# Patient Record
Sex: MALE | Race: BLACK OR AFRICAN AMERICAN | Employment: FULL TIME | URBAN - METROPOLITAN AREA
[De-identification: names, ages, dates, MRNs, and addresses within clinical notes are randomized per-mention and may not be internally consistent; named-entity substitution may affect disease eponyms.]

---

## 2019-10-18 ENCOUNTER — APPOINTMENT (OUTPATIENT)
Dept: NON INVASIVE DIAGNOSTICS | Age: 49
End: 2019-10-18
Attending: INTERNAL MEDICINE
Payer: COMMERCIAL

## 2019-10-18 ENCOUNTER — HOSPITAL ENCOUNTER (OUTPATIENT)
Age: 49
Setting detail: OBSERVATION
LOS: 2 days | Discharge: HOME | End: 2019-10-21
Attending: STUDENT IN AN ORGANIZED HEALTH CARE EDUCATION/TRAINING PROGRAM | Admitting: INTERNAL MEDICINE
Payer: COMMERCIAL

## 2019-10-18 ENCOUNTER — APPOINTMENT (OUTPATIENT)
Dept: CT IMAGING | Age: 49
End: 2019-10-18
Attending: STUDENT IN AN ORGANIZED HEALTH CARE EDUCATION/TRAINING PROGRAM
Payer: COMMERCIAL

## 2019-10-18 ENCOUNTER — APPOINTMENT (OUTPATIENT)
Dept: GENERAL RADIOLOGY | Age: 49
End: 2019-10-18
Attending: STUDENT IN AN ORGANIZED HEALTH CARE EDUCATION/TRAINING PROGRAM
Payer: COMMERCIAL

## 2019-10-18 DIAGNOSIS — R42 DIZZINESS: ICD-10-CM

## 2019-10-18 DIAGNOSIS — I63.9 CEREBROVASCULAR ACCIDENT (CVA), UNSPECIFIED MECHANISM (HCC): ICD-10-CM

## 2019-10-18 DIAGNOSIS — R42 VERTIGO: Primary | ICD-10-CM

## 2019-10-18 DIAGNOSIS — Z92.82 RECEIVED INTRAVENOUS TISSUE PLASMINOGEN ACTIVATOR (TPA) IN EMERGENCY DEPARTMENT: ICD-10-CM

## 2019-10-18 PROBLEM — E78.49 OTHER HYPERLIPIDEMIA: Status: ACTIVE | Noted: 2019-10-18

## 2019-10-18 PROBLEM — I10 ESSENTIAL HYPERTENSION: Status: ACTIVE | Noted: 2019-10-18

## 2019-10-18 LAB
ALBUMIN SERPL-MCNC: 4 G/DL (ref 3.5–5)
ALBUMIN/GLOB SERPL: 1 {RATIO} (ref 1.1–2.2)
ALP SERPL-CCNC: 63 U/L (ref 45–117)
ALT SERPL-CCNC: 31 U/L (ref 12–78)
ANION GAP SERPL CALC-SCNC: 7 MMOL/L (ref 5–15)
AST SERPL-CCNC: 24 U/L (ref 15–37)
ATRIAL RATE: 79 BPM
BASOPHILS # BLD: 0 K/UL (ref 0–0.1)
BASOPHILS NFR BLD: 0 % (ref 0–1)
BILIRUB SERPL-MCNC: 0.6 MG/DL (ref 0.2–1)
BUN SERPL-MCNC: 22 MG/DL (ref 6–20)
BUN/CREAT SERPL: 25 (ref 12–20)
CALCIUM SERPL-MCNC: 9.2 MG/DL (ref 8.5–10.1)
CALCULATED P AXIS, ECG09: 68 DEGREES
CALCULATED R AXIS, ECG10: 20 DEGREES
CALCULATED T AXIS, ECG11: -9 DEGREES
CHLORIDE SERPL-SCNC: 107 MMOL/L (ref 97–108)
CO2 SERPL-SCNC: 25 MMOL/L (ref 21–32)
COMMENT, HOLDF: NORMAL
CREAT SERPL-MCNC: 0.88 MG/DL (ref 0.7–1.3)
DIAGNOSIS, 93000: NORMAL
DIFFERENTIAL METHOD BLD: NORMAL
ECHO LA MAJOR AXIS: 2.98 CM
ECHO LV INTERNAL DIMENSION DIASTOLIC: 4.57 CM (ref 4.2–5.9)
ECHO LV INTERNAL DIMENSION SYSTOLIC: 2.57 CM
ECHO LV IVSD: 1.05 CM (ref 0.6–1)
ECHO LV MASS 2D: 188.8 G (ref 88–224)
ECHO LV POSTERIOR WALL DIASTOLIC: 1 CM (ref 0.6–1)
ECHO MV E VELOCITY: 48.66 CM/S
ECHO TV REGURGITANT MAX VELOCITY: 213.85 CM/S
ECHO TV REGURGITANT PEAK GRADIENT: 18.3 MMHG
EOSINOPHIL # BLD: 0.1 K/UL (ref 0–0.4)
EOSINOPHIL NFR BLD: 1 % (ref 0–7)
ERYTHROCYTE [DISTWIDTH] IN BLOOD BY AUTOMATED COUNT: 14.1 % (ref 11.5–14.5)
GLOBULIN SER CALC-MCNC: 4.2 G/DL (ref 2–4)
GLUCOSE SERPL-MCNC: 116 MG/DL (ref 65–100)
HCT VFR BLD AUTO: 42.4 % (ref 36.6–50.3)
HGB BLD-MCNC: 13.3 G/DL (ref 12.1–17)
IMM GRANULOCYTES # BLD AUTO: 0 K/UL (ref 0–0.04)
IMM GRANULOCYTES NFR BLD AUTO: 0 % (ref 0–0.5)
INR PPP: 0.9 (ref 0.9–1.1)
LYMPHOCYTES # BLD: 2.5 K/UL (ref 0.8–3.5)
LYMPHOCYTES NFR BLD: 26 % (ref 12–49)
MCH RBC QN AUTO: 27.4 PG (ref 26–34)
MCHC RBC AUTO-ENTMCNC: 31.4 G/DL (ref 30–36.5)
MCV RBC AUTO: 87.4 FL (ref 80–99)
MONOCYTES # BLD: 0.8 K/UL (ref 0–1)
MONOCYTES NFR BLD: 8 % (ref 5–13)
NEUTS SEG # BLD: 6.3 K/UL (ref 1.8–8)
NEUTS SEG NFR BLD: 65 % (ref 32–75)
NRBC # BLD: 0 K/UL (ref 0–0.01)
NRBC BLD-RTO: 0 PER 100 WBC
P-R INTERVAL, ECG05: 134 MS
PLATELET # BLD AUTO: 213 K/UL (ref 150–400)
PMV BLD AUTO: 10.7 FL (ref 8.9–12.9)
POTASSIUM SERPL-SCNC: 3.9 MMOL/L (ref 3.5–5.1)
PROT SERPL-MCNC: 8.2 G/DL (ref 6.4–8.2)
PROTHROMBIN TIME: 9.5 SEC (ref 9–11.1)
Q-T INTERVAL, ECG07: 378 MS
QRS DURATION, ECG06: 92 MS
QTC CALCULATION (BEZET), ECG08: 433 MS
RBC # BLD AUTO: 4.85 M/UL (ref 4.1–5.7)
SAMPLES BEING HELD,HOLD: NORMAL
SODIUM SERPL-SCNC: 139 MMOL/L (ref 136–145)
TROPONIN I SERPL-MCNC: <0.05 NG/ML
VENTRICULAR RATE, ECG03: 79 BPM
WBC # BLD AUTO: 9.7 K/UL (ref 4.1–11.1)

## 2019-10-18 PROCEDURE — 84484 ASSAY OF TROPONIN QUANT: CPT

## 2019-10-18 PROCEDURE — 65610000006 HC RM INTENSIVE CARE

## 2019-10-18 PROCEDURE — 74011636320 HC RX REV CODE- 636/320: Performed by: RADIOLOGY

## 2019-10-18 PROCEDURE — 99285 EMERGENCY DEPT VISIT HI MDM: CPT

## 2019-10-18 PROCEDURE — 85025 COMPLETE CBC W/AUTO DIFF WBC: CPT

## 2019-10-18 PROCEDURE — 74011000258 HC RX REV CODE- 258: Performed by: STUDENT IN AN ORGANIZED HEALTH CARE EDUCATION/TRAINING PROGRAM

## 2019-10-18 PROCEDURE — 74011250636 HC RX REV CODE- 250/636

## 2019-10-18 PROCEDURE — 85610 PROTHROMBIN TIME: CPT

## 2019-10-18 PROCEDURE — 74011250636 HC RX REV CODE- 250/636: Performed by: STUDENT IN AN ORGANIZED HEALTH CARE EDUCATION/TRAINING PROGRAM

## 2019-10-18 PROCEDURE — 0042T CT CODE NEURO PERF W CBF: CPT

## 2019-10-18 PROCEDURE — 93005 ELECTROCARDIOGRAM TRACING: CPT

## 2019-10-18 PROCEDURE — 51702 INSERT TEMP BLADDER CATH: CPT

## 2019-10-18 PROCEDURE — 36415 COLL VENOUS BLD VENIPUNCTURE: CPT

## 2019-10-18 PROCEDURE — 80053 COMPREHEN METABOLIC PANEL: CPT

## 2019-10-18 PROCEDURE — 70496 CT ANGIOGRAPHY HEAD: CPT

## 2019-10-18 PROCEDURE — 74011250636 HC RX REV CODE- 250/636: Performed by: INTERNAL MEDICINE

## 2019-10-18 PROCEDURE — 71045 X-RAY EXAM CHEST 1 VIEW: CPT

## 2019-10-18 PROCEDURE — 92610 EVALUATE SWALLOWING FUNCTION: CPT | Performed by: SPEECH-LANGUAGE PATHOLOGIST

## 2019-10-18 PROCEDURE — 70450 CT HEAD/BRAIN W/O DYE: CPT

## 2019-10-18 PROCEDURE — 96374 THER/PROPH/DIAG INJ IV PUSH: CPT

## 2019-10-18 PROCEDURE — 74011000258 HC RX REV CODE- 258: Performed by: RADIOLOGY

## 2019-10-18 PROCEDURE — 96375 TX/PRO/DX INJ NEW DRUG ADDON: CPT

## 2019-10-18 PROCEDURE — 93306 TTE W/DOPPLER COMPLETE: CPT

## 2019-10-18 RX ORDER — ONDANSETRON 2 MG/ML
4 INJECTION INTRAMUSCULAR; INTRAVENOUS
Status: COMPLETED | OUTPATIENT
Start: 2019-10-18 | End: 2019-10-18

## 2019-10-18 RX ORDER — MECLIZINE HCL 12.5 MG 12.5 MG/1
25 TABLET ORAL
Status: COMPLETED | OUTPATIENT
Start: 2019-10-18 | End: 2019-10-18

## 2019-10-18 RX ORDER — SODIUM CHLORIDE 0.9 % (FLUSH) 0.9 %
10 SYRINGE (ML) INJECTION
Status: COMPLETED | OUTPATIENT
Start: 2019-10-18 | End: 2019-10-18

## 2019-10-18 RX ORDER — ACETAMINOPHEN 325 MG/1
650 TABLET ORAL
Status: DISCONTINUED | OUTPATIENT
Start: 2019-10-18 | End: 2019-10-21 | Stop reason: HOSPADM

## 2019-10-18 RX ORDER — SODIUM CHLORIDE 0.9 % (FLUSH) 0.9 %
30 SYRINGE (ML) INJECTION
Status: COMPLETED | OUTPATIENT
Start: 2019-10-18 | End: 2019-10-18

## 2019-10-18 RX ORDER — SODIUM CHLORIDE 9 MG/ML
50 INJECTION, SOLUTION INTRAVENOUS ONCE
Status: COMPLETED | OUTPATIENT
Start: 2019-10-18 | End: 2019-10-18

## 2019-10-18 RX ORDER — MECLIZINE HCL 12.5 MG 12.5 MG/1
25 TABLET ORAL
Status: DISCONTINUED | OUTPATIENT
Start: 2019-10-18 | End: 2019-10-21 | Stop reason: HOSPADM

## 2019-10-18 RX ORDER — ONDANSETRON 2 MG/ML
4 INJECTION INTRAMUSCULAR; INTRAVENOUS
Status: DISCONTINUED | OUTPATIENT
Start: 2019-10-18 | End: 2019-10-21 | Stop reason: HOSPADM

## 2019-10-18 RX ORDER — CLONIDINE HYDROCHLORIDE 0.1 MG/1
0.1 TABLET ORAL
Status: DISCONTINUED | OUTPATIENT
Start: 2019-10-18 | End: 2019-10-21 | Stop reason: HOSPADM

## 2019-10-18 RX ORDER — LABETALOL HYDROCHLORIDE 5 MG/ML
INJECTION, SOLUTION INTRAVENOUS
Status: DISCONTINUED
Start: 2019-10-18 | End: 2019-10-18 | Stop reason: WASHOUT

## 2019-10-18 RX ORDER — SODIUM CHLORIDE 9 MG/ML
50 INJECTION, SOLUTION INTRAVENOUS ONCE
Status: DISPENSED | OUTPATIENT
Start: 2019-10-18 | End: 2019-10-18

## 2019-10-18 RX ADMIN — SODIUM CHLORIDE 50 ML: 900 INJECTION, SOLUTION INTRAVENOUS at 10:35

## 2019-10-18 RX ADMIN — ONDANSETRON 4 MG: 2 INJECTION INTRAMUSCULAR; INTRAVENOUS at 09:27

## 2019-10-18 RX ADMIN — ALTEPLASE 70.88 MG: KIT at 09:34

## 2019-10-18 RX ADMIN — Medication 30 ML: at 18:59

## 2019-10-18 RX ADMIN — SODIUM CHLORIDE 100 ML: 900 INJECTION, SOLUTION INTRAVENOUS at 09:49

## 2019-10-18 RX ADMIN — IOPAMIDOL 120 ML: 755 INJECTION, SOLUTION INTRAVENOUS at 09:49

## 2019-10-18 RX ADMIN — Medication 10 ML: at 09:49

## 2019-10-18 RX ADMIN — MECLIZINE 25 MG: 12.5 TABLET ORAL at 20:50

## 2019-10-18 RX ADMIN — MECLIZINE 25 MG: 12.5 TABLET ORAL at 10:49

## 2019-10-18 NOTE — ED TRIAGE NOTES
Triage: Patient arrives via EMS with c/o sudden onset of vertigo that started at 0600. Patient also states he has a slight headache. Patient has no history of this prior. BG 81.   LKW 0600

## 2019-10-18 NOTE — PROGRESS NOTES
Admission Medication Reconciliation:    Information obtained from:  patient   RxQuery data available¹:  YES    Comments/Recommendations: Updated PTA meds/reviewed patient's allergies. 1)  History was obtained from the patient who appears to be an accurate historian. 2)  Medication changes (since last review): Added  - none    Adjusted  - none    Removed  - cetirizine, fluticasone nasal spray, multivitamin     ¹RxQuery pharmacy benefit data reflects medications filled and processed through the patient's insurance, however   this data does NOT capture whether the medication was picked up or is currently being taken by the patient. Allergies:  Patient has no known allergies. Significant PMH/Disease States:   Past Medical History:   Diagnosis Date    Hypercholesterolemia     Ill-defined condition     sacrcoidosis    Neurological disorder     herniated disc    Sarcoidosis      Chief Complaint for this Admission:    Chief Complaint   Patient presents with    Dizziness     Prior to Admission Medications:   Prior to Admission Medications   Prescriptions Last Dose Informant Patient Reported? Taking?   trandolapril (MAVIK) 4 mg tablet 10/18/2019 at am  No Yes   Sig: Take 1 Tab by mouth two (2) times a day. zolpidem (AMBIEN) 5 mg tablet   No Yes   Sig: Take 1 Tab by mouth nightly as needed for Sleep. Max Daily Amount: 5 mg. Facility-Administered Medications: None       Please contact the main inpatient pharmacy with any questions or concerns at [phone] and we will direct you to the clinical pharmacist covering this patient's care while in-house.    Christal Quezada PHARMD

## 2019-10-18 NOTE — ROUTINE PROCESS
TRANSFER - OUT REPORT: 
 
Verbal report given to Yoli Kumar RN (name) on Samantha Shen  being transferred to ICU (unit) for routine progression of care Report consisted of patients Situation, Background, Assessment and  
Recommendations(SBAR). Information from the following report(s) SBAR, Kardex, ED Summary, Intake/Output, MAR and Recent Results was reviewed with the receiving nurse. Lines:  
Peripheral IV 10/18/19 Left Antecubital (Active) Site Assessment Clean, dry, & intact 10/18/2019  9:54 AM  
Phlebitis Assessment 0 10/18/2019  9:54 AM  
Infiltration Assessment 0 10/18/2019  9:54 AM  
Dressing Status Clean, dry, & intact 10/18/2019  9:54 AM  
   
Peripheral IV 10/18/19 Left; Inner Antecubital (Active) Site Assessment Clean, dry, & intact 10/18/2019  9:54 AM  
Phlebitis Assessment 0 10/18/2019  9:54 AM  
Infiltration Assessment 0 10/18/2019  9:54 AM  
Dressing Status Clean, dry, & intact 10/18/2019  9:54 AM  
  
 
Opportunity for questions and clarification was provided. Patient transported with: 
 Monitor Registered Nurse

## 2019-10-18 NOTE — CONSULTS
INPATIENT NEUROLOGY CONSULTATION  10/18/2019     Consulted by: Anthony Parker MD        Patient ID:  Cheryle Noble  [id number]  52 y.o.  1970    Chief Complaint   Patient presents with    Dizziness       HPI    55-year-old gentleman with hypertension who is here because of acute onset vertigo early this morning. It began suddenly with nausea and ataxia exacerbated with movement. He had no double vision or slurred speech. He was brought to the hospital via EMS. CTA CT perfusion both negative. He received TPA. He admits that in recent weeks he has been changing to a high-protein high-fat diet with insufficient water intake. He received meclizine a few hours ago and thinks it might be helping but he still feels vertiginous. He does not take any antiplatelets. Review of Systems   Eyes: Negative for double vision. Gastrointestinal: Positive for nausea. Neurological: Positive for dizziness. Negative for speech change and focal weakness. Imbalance   All other systems reviewed and are negative.       Past Medical History:   Diagnosis Date    Essential hypertension 10/18/2019    Hypercholesterolemia     Ill-defined condition     sacrcoidosis    Neurological disorder     herniated disc    Other hyperlipidemia 10/18/2019    Sarcoidosis      Family History   Problem Relation Age of Onset    Cancer Mother         Lung    Hypertension Mother     Stroke Father     Hypertension Father      Social History     Socioeconomic History    Marital status:      Spouse name: Not on file    Number of children: Not on file    Years of education: Not on file    Highest education level: Not on file   Occupational History    Not on file   Social Needs    Financial resource strain: Not on file    Food insecurity:     Worry: Not on file     Inability: Not on file    Transportation needs:     Medical: Not on file     Non-medical: Not on file   Tobacco Use    Smoking status: Never Smoker    Smokeless tobacco: Never Used   Substance and Sexual Activity    Alcohol use: Yes     Comment: seldom    Drug use: Not on file    Sexual activity: Yes     Partners: Female   Lifestyle    Physical activity:     Days per week: Not on file     Minutes per session: Not on file    Stress: Not on file   Relationships    Social connections:     Talks on phone: Not on file     Gets together: Not on file     Attends [employer] service: Not on file     Active member of club or organization: Not on file     Attends meetings of clubs or organizations: Not on file     Relationship status: Not on file    Intimate partner violence:     Fear of current or ex partner: Not on file     Emotionally abused: Not on file     Physically abused: Not on file     Forced sexual activity: Not on file   Other Topics Concern    Not on file   Social History Narrative    Not on file     Current Facility-Administered Medications   Medication Dose Route Frequency    labetalol (NORMODYNE;TRANDATE) 5 mg/mL injection        0.9% sodium chloride infusion 50 mL  50 mL IntraVENous ONCE    iopamidol (ISOVUE-370) 76 % injection        iopamidol (ISOVUE-370) 76 % injection         Current Outpatient Medications   Medication Sig    trandolapril (MAVIK) 4 mg tablet Take 1 Tab by mouth two (2) times a day.  zolpidem (AMBIEN) 5 mg tablet Take 1 Tab by mouth nightly as needed for Sleep. Max Daily Amount: 5 mg. No Known Allergies    Visit Vitals  /78   Pulse 73   Temp 98.4 °F (36.9 °C)   Resp 20   Wt 87.5 kg (192 lb 14.4 oz)   SpO2 98%   BMI 25.45 kg/m²     Physical Exam   Constitutional: He is oriented to person, place, and time. He appears well-developed and well-nourished. Cardiovascular: Normal rate. Pulmonary/Chest: Effort normal.   Neurological: He is oriented to person, place, and time. He has normal strength. He has a normal Finger-Nose-Finger Test.   Skin: Skin is warm.    Psychiatric: His behavior is normal.   Vitals reviewed. Neurologic Exam     Mental Status   Oriented to person, place, and time. Cranial Nerves   Cranial nerves II through XII intact. BL multidirectional nystagmus     Motor Exam   Muscle bulk: normal    Strength   Strength 5/5 throughout. Sensory Exam   Light touch normal.     Gait, Coordination, and Reflexes     Gait  Gait: (def, post TPA )    Coordination   Finger to nose coordination: normal    Tremor   Resting tremor: absent           Lab Results   Component Value Date/Time    WBC 9.7 10/18/2019 09:22 AM    WBC (POC) 8.0 08/07/2019 11:14 AM    HGB 13.3 10/18/2019 09:22 AM    HGB (POC) 13.0 08/07/2019 11:14 AM    HCT 42.4 10/18/2019 09:22 AM    HCT (POC) 39.1 08/07/2019 11:14 AM    PLATELET 202 85/58/0788 09:22 AM    PLATELET (POC) 479 84/74/1266 11:14 AM    MCV 87.4 10/18/2019 09:22 AM    MCV (POC) 83.2 08/07/2019 11:14 AM     Lab Results   Component Value Date/Time    Glucose 116 (H) 10/18/2019 09:22 AM    LDL, calculated 182 (H) 08/07/2019 11:01 AM    Creatinine 0.88 10/18/2019 09:22 AM      Lab Results   Component Value Date/Time    Cholesterol, total 254 (H) 08/07/2019 11:01 AM    HDL Cholesterol 55 08/07/2019 11:01 AM    LDL, calculated 182 (H) 08/07/2019 11:01 AM    Triglyceride 84 08/07/2019 11:01 AM     Lab Results   Component Value Date/Time    ALT (SGPT) 31 10/18/2019 09:22 AM    AST (SGOT) 24 10/18/2019 09:22 AM    Alk. phosphatase 63 10/18/2019 09:22 AM    Bilirubin, total 0.6 10/18/2019 09:22 AM    Albumin 4.0 10/18/2019 09:22 AM    Protein, total 8.2 10/18/2019 09:22 AM    INR 0.9 10/18/2019 09:22 AM    Prothrombin time 9.5 10/18/2019 09:22 AM    PLATELET 496 04/05/4854 09:22 AM    PLATELET (POC) 770 77/89/7048 11:14 AM        CT Results (maximum last 3):   Results from [facility] encounter on 10/18/19   CT CODE NEURO PERF W CBF    Narrative CLINICAL HISTORY: CODE S     INDICATION:  CODE S     EXAMINATION:    COMPARISON:  CT head 10/18/2019  TECHNIQUE:    CT dose reduction was achieved through use of a standardized protocol tailored  for this examination and automatic exposure control for dose modulation. Subsequently, Following the uneventful administration of Isovue-370 contrast  material, axial CT angiography of the head and neck was performed. Coronal and  sagittal reconstructions were obtained. 3D MAXIMAL INTENSITY PROJECTION reconstructed imaging of the cranial vasculature  in both the coronal and sagittal plane was performed. CT brain perfusion was performed with generation of hemodynamic maps of multiple  parameters, including cerebral blood flow, cerebral blood volume, and MTT (mean  transit time). CT dose reduction was achieved through use of a standardized  protocol tailored for this examination and automatic exposure control for dose  modulation. FINDINGS:  There is no large pulmonary mass or nodule. 3 vessel arch. .  There is no acute  intra or extra-axial hemorrhage. The gray white differentiation is maintained. The basal cisterns are clear. The paranasal sinuses are clear. CTA NECK:   There is conventional three vessel arch anatomy. The origins of the innominate,  left common carotid and left subclavian arteries are normal.  The common carotid  arteries are normal.  The right internal carotid artery is normal.  The left  internal carotid artery is normal.  The right vertebral artery is patent. The  left vertebral artery is patent. Left vertebral artery slightly larger than the  right vertebral artery. Vertebral artery origins are within normal limits. The  soft tissues of the neck are unremarkable. There are degenerative changes of  the cervical spine. The lung apices are clear. There is  0 %stenosis in the right internal carotid artery utilizing NASCET  criteria. There is  None % stenosis in the right internal carotid artery utilizing NASCET  criteria. CTA HEAD:  Dural venous sinuses are patent. A 2 segments are within normal limits. A1  segments within normal limits. M1 segments within normal limits. M2 segments  demonstrate symmetric arborization. There are small bilateral posterior  communicating arteries. P1 and P2 segments are patent. The basilar artery and  its branches are normal. The right vertebral artery predominantly terminates in  PICA. PICA on the right and on the left are patent. The internal carotid,  anterior cerebral, and middle cerebral arteries are patent. There is no  flow-limiting intracranial stenosis. There is no aneurysm. There are no sizable  posterior communicating arteries. CT PERFUSION:There is no significant perfusion abnormality. Impression IMPRESSION:  No flow limiting stenosis or intracranial aneurysm. No acute intracranial process. Imaging findings called to Dr. Phylicia Solano. CTA CODE NEURO HEAD AND NECK W CONT    Narrative CLINICAL HISTORY: CODE S     INDICATION:  CODE S     EXAMINATION:    COMPARISON:  CT head 10/18/2019  TECHNIQUE:    CT dose reduction was achieved through use of a standardized protocol tailored  for this examination and automatic exposure control for dose modulation. Subsequently, Following the uneventful administration of Isovue-370 contrast  material, axial CT angiography of the head and neck was performed. Coronal and  sagittal reconstructions were obtained. 3D MAXIMAL INTENSITY PROJECTION reconstructed imaging of the cranial vasculature  in both the coronal and sagittal plane was performed. CT brain perfusion was performed with generation of hemodynamic maps of multiple  parameters, including cerebral blood flow, cerebral blood volume, and MTT (mean  transit time). CT dose reduction was achieved through use of a standardized  protocol tailored for this examination and automatic exposure control for dose  modulation. FINDINGS:  There is no large pulmonary mass or nodule. 3 vessel arch. .  There is no acute  intra or extra-axial hemorrhage.  The gray white differentiation is maintained. The basal cisterns are clear. The paranasal sinuses are clear. CTA NECK:   There is conventional three vessel arch anatomy. The origins of the innominate,  left common carotid and left subclavian arteries are normal.  The common carotid  arteries are normal.  The right internal carotid artery is normal.  The left  internal carotid artery is normal.  The right vertebral artery is patent. The  left vertebral artery is patent. Left vertebral artery slightly larger than the  right vertebral artery. Vertebral artery origins are within normal limits. The  soft tissues of the neck are unremarkable. There are degenerative changes of  the cervical spine. The lung apices are clear. There is  0 %stenosis in the right internal carotid artery utilizing NASCET  criteria. There is  None % stenosis in the right internal carotid artery utilizing NASCET  criteria. CTA HEAD:  Dural venous sinuses are patent. A 2 segments are within normal limits. A1  segments within normal limits. M1 segments within normal limits. M2 segments  demonstrate symmetric arborization. There are small bilateral posterior  communicating arteries. P1 and P2 segments are patent. The basilar artery and  its branches are normal. The right vertebral artery predominantly terminates in  PICA. PICA on the right and on the left are patent. The internal carotid,  anterior cerebral, and middle cerebral arteries are patent. There is no  flow-limiting intracranial stenosis. There is no aneurysm. There are no sizable  posterior communicating arteries. CT PERFUSION:There is no significant perfusion abnormality. Impression IMPRESSION:  No flow limiting stenosis or intracranial aneurysm. No acute intracranial process. Imaging findings called to Dr. George Mitchell. Assessment and Plan        45-year-old gentleman presenting with acute vertigo. I suspect this was peripheral in origin.   He has typical positional history and some multidirectional nystagmus on exam.  I am not hearing any red flags for double vision or slurred speech. Regardless, follow post TPA protocol. No antiplatelets at this time. Check MRI brain. Okay to use meclizine. Neurology will follow up. During this evaluation, we also discussed stroke education to include signs and symptoms of stroke and TIA. This clinical note was dictated with an electronic dictation software that can make unintentional errors. If there are any questions, please contact me directly for clarification.       [facility],   NEUROLOGIST  Diplomate KENNA  10/18/2019

## 2019-10-18 NOTE — PROGRESS NOTES
Speech Pathology bedside swallow evaluation/discharge  Patient: Alejandro Reyes (47 y.o. male)  Date: 10/18/2019  Primary Diagnosis: CVA (cerebral vascular accident) [facility]) [I63.9]       Precautions: TPA        ASSESSMENT :  Based on the objective data described below, the patient presents with no oral or pharyngeal dysphagia. Timely and complete mastication, timely swallow initiation and functional hyolaryngeal elevation/excursion via palpation. No s/s of aspiration observed. No complaints regarding speech/language deficits. Skilled acute therapy provided by a speech-language pathologist is not indicated at this time. PLAN :  Recommendations:  --regular diet. No further SLP needs   Discharge Recommendations: None     SUBJECTIVE:   Patient stated I just want to go home, I shouldn't be taking up this ICU bed. OBJECTIVE:     Past Medical History:   Diagnosis Date    Essential hypertension 10/18/2019    Hypercholesterolemia     Ill-defined condition     sacrcoidosis    Neurological disorder     herniated disc    Other hyperlipidemia 10/18/2019    Sarcoidosis      Past Surgical History:   Procedure Laterality Date    HX ORTHOPAEDIC      thumb    HX OTHER SURGICAL      jose cyst     Prior Level of Function/Home Situation:      Diet prior to admission: regular  Current Diet:  NPO    Cognitive and Communication Status:  Neurologic State: Alert, Appropriate for age  Orientation Level: Oriented X4  Cognition: Appropriate decision making, Follows commands  Perception: Appears intact  Perseveration: No perseveration noted  Safety/Judgement: Awareness of environment, Insight into deficits  Oral Assessment:  Oral Assessment  Labial: No impairment  Dentition: Natural  Oral Hygiene: moist mucosa   Lingual: No impairment  Mandible: No impairment  P.O. Trials:  Patient Position: upright in bed   Vocal quality prior to P.O.: No impairment  Consistency Presented: Thin liquid;Puree; Solid  How Presented: Self-fed/presented;Cup/sip;Cup/gulp; Spoon;Straw;Successive swallows     Bolus Acceptance: No impairment  Bolus Formation/Control: No impairment     Propulsion: No impairment  Oral Residue: None  Initiation of Swallow: No impairment  Laryngeal Elevation: Functional  Aspiration Signs/Symptoms: None  Pharyngeal Phase Characteristics: No impairment, issues, or problems              Oral Phase Severity: No impairment  Pharyngeal Phase Severity : No impairment  NOMS:   The NOMS functional outcome measure was used to quantify this patient's level of swallowing impairment. Based on the NOMS, the patient was determined to be at level 7 for swallow function     NOMS Swallowing Levels:  Level 1 (CN): NPO  Level 2 (CM): NPO but takes consistency in therapy  Level 3 (CL): Takes less than 50% of nutrition p.o. and continues with nonoral feedings; and/or safe with mod cues; and/or max diet restriction  Level 4 (CK): Safe swallow but needs mod cues; and/or mod diet restriction; and/or still requires some nonoral feeding/supplements  Level 5 (CJ): Safe swallow with min diet restriction; and/or needs min cues  Level 6 (CI): Independent with p.o.; rare cues; usually self cues; may need to avoid some foods or needs extra time  Level 7 ([address] [place] [address]): Independent for all p.o.  OSMAN. (2003). National Outcomes Measurement System (NOMS): Adult Speech-Language Pathology User's Guide. Pain:  Pain Scale 1: Numeric (0 - 10)  Pain Intensity 1: 3  Pain Location 1: Head  After treatment:   [] Patient left in no apparent distress sitting up in chair  [x] Patient left in no apparent distress in bed  [x] Call bell left within reach  [x] Nursing notified  [] Caregiver present  [] Bed alarm activated    COMMUNICATION/EDUCATION:   The patients plan of care including findings, recommendations, and recommended diet changes were discussed with: Registered Nurse.   Patient was educated regarding His functional swallow as this relates to His diagnosis of CVA workup. He demonstrated Good understanding as evidenced by verbalization of understanding. [x] Patient/family have participated as able and agree with findings and recommendations. [] Patient is unable to participate in plan of care at this time. Thank you for this referral.  Hennie Ganser, M.CD.  CCC-SLP   Time Calculation: 15 mins

## 2019-10-18 NOTE — Clinical Note
Sheath #1: Dressed using non-adherent, topical skin adhesive and transparent dressing. Site: clean, dry, & intact.

## 2019-10-18 NOTE — H&P
History and Physical    Subjective:     Patricia Paniagua is a 52 y.o. black male with HTN & hyperlipidemia. He woke up this am with acute vertigo that was rather severe. No other focal neurologic sx's. No CP/SOB. In the ER, a \"code stroke\" was called, and head CT showed no hemorrhage. Teleneurology evaluated pt, and they felt pt had a CVA; and they recommended TPA -- This was given. Pt still has vertigo sx's when he sits up or turns his head to the left. He is otherwise without new c/o's. He has HTN for which he takes trandolapril. He had recent lipids done, and I recommended a statin. However, he wanted to work on diet, etc, and recheck lipids in a few months. Past Medical History:   Diagnosis Date    Essential hypertension 10/18/2019    Hypercholesterolemia     Ill-defined condition     sacrcoidosis    Neurological disorder     herniated disc    Other hyperlipidemia 10/18/2019    Sarcoidosis      No Known Allergies  Prior to Admission medications    Medication Sig Start Date End Date Taking? Authorizing Provider   trandolapril (MAVIK) 4 mg tablet Take 1 Tab by mouth two (2) times a day. 8/7/19  Yes Lc Gee MD   zolpidem (AMBIEN) 5 mg tablet Take 1 Tab by mouth nightly as needed for Sleep. Max Daily Amount: 5 mg. 8/7/19  Yes Lc Gee MD     Social History     Tobacco Use    Smoking status: Never Smoker    Smokeless tobacco: Never Used   Substance Use Topics    Alcohol use: Yes     Comment: seldom     Family History   Problem Relation Age of Onset    Cancer Mother         Lung    Hypertension Mother     Stroke Father     Hypertension Father                Review of Systems:  As above. Objective:       Physical Exam:   In NAD. A&O. HEENT -- Pupils round. O/P Clear. Neck -- Supple. No JVD. No CB's. Heart -- RRR. No R/M/G. Lungs -- CTA. Abdomen -- Soft. Non-tender. Non-distended. No masses. Bowel sounds present. Extremeties -- No edema.   Neuro -- CN's intact. Strength grossly intact & equal throughout. Sensation to light touch grossly intact & equal throughout. Data Review:   Recent Results (from the past 24 hour(s))   EKG, 12 LEAD, INITIAL    Collection Time: 10/18/19  9:08 AM   Result Value Ref Range    Ventricular Rate 79 BPM    Atrial Rate 79 BPM    P-R Interval 134 ms    QRS Duration 92 ms    Q-T Interval 378 ms    QTC Calculation (Bezet) 433 ms    Calculated P Axis 68 degrees    Calculated R Axis 20 degrees    Calculated T Axis -9 degrees    Diagnosis       Normal sinus rhythm  Non-specific ST & T wave changes  When compared with ECG of 03-NOV-2015 12:39,  No significant change was found  Confirmed by Dino Horner M.D., Troy Aviles ([id number]) on 10/18/2019 9:34:54 AM     CBC WITH AUTOMATED DIFF    Collection Time: 10/18/19  9:22 AM   Result Value Ref Range    WBC 9.7 4.1 - 11.1 K/uL    RBC 4.85 4.10 - 5.70 M/uL    HGB 13.3 12.1 - 17.0 g/dL    HCT 42.4 36.6 - 50.3 %    MCV 87.4 80.0 - 99.0 FL    MCH 27.4 26.0 - 34.0 PG    MCHC 31.4 30.0 - 36.5 g/dL    RDW 14.1 11.5 - 14.5 %    PLATELET 572 543 - 556 K/uL    MPV 10.7 8.9 - 12.9 FL    NRBC 0.0 0  WBC    ABSOLUTE NRBC 0.00 0.00 - 0.01 K/uL    NEUTROPHILS 65 32 - 75 %    LYMPHOCYTES 26 12 - 49 %    MONOCYTES 8 5 - 13 %    EOSINOPHILS 1 0 - 7 %    BASOPHILS 0 0 - 1 %    IMMATURE GRANULOCYTES 0 0.0 - 0.5 %    ABS. NEUTROPHILS 6.3 1.8 - 8.0 K/UL    ABS. LYMPHOCYTES 2.5 0.8 - 3.5 K/UL    ABS. MONOCYTES 0.8 0.0 - 1.0 K/UL    ABS. EOSINOPHILS 0.1 0.0 - 0.4 K/UL    ABS. BASOPHILS 0.0 0.0 - 0.1 K/UL    ABS. IMM.  GRANS. 0.0 0.00 - 0.04 K/UL    DF AUTOMATED     METABOLIC PANEL, COMPREHENSIVE    Collection Time: 10/18/19  9:22 AM   Result Value Ref Range    Sodium 139 136 - 145 mmol/L    Potassium 3.9 3.5 - 5.1 mmol/L    Chloride 107 97 - 108 mmol/L    CO2 25 21 - 32 mmol/L    Anion gap 7 5 - 15 mmol/L    Glucose 116 (H) 65 - 100 mg/dL    BUN 22 (H) 6 - 20 MG/DL    Creatinine 0.88 0.70 - 1.30 MG/DL BUN/Creatinine ratio 25 (H) 12 - 20      GFR est AA >60 >60 ml/min/1.73m2    GFR est non-AA >60 >60 ml/min/1.73m2    Calcium 9.2 8.5 - 10.1 MG/DL    Bilirubin, total 0.6 0.2 - 1.0 MG/DL    ALT (SGPT) 31 12 - 78 U/L    AST (SGOT) 24 15 - 37 U/L    Alk. phosphatase 63 45 - 117 U/L    Protein, total 8.2 6.4 - 8.2 g/dL    Albumin 4.0 3.5 - 5.0 g/dL    Globulin 4.2 (H) 2.0 - 4.0 g/dL    A-G Ratio 1.0 (L) 1.1 - 2.2     PROTHROMBIN TIME + INR    Collection Time: 10/18/19  9:22 AM   Result Value Ref Range    INR 0.9 0.9 - 1.1      Prothrombin time 9.5 9.0 - 11.1 sec   TROPONIN I    Collection Time: 10/18/19  9:22 AM   Result Value Ref Range    Troponin-I, Qt. <0.05 <0.05 ng/mL   SAMPLES BEING HELD    Collection Time: 10/18/19  9:22 AM   Result Value Ref Range    SAMPLES BEING HELD  1 RED     COMMENT        Add-on orders for these samples will be processed based on acceptable specimen integrity and analyte stability, which may vary by analyte. Assessment:     Principal Problem:    CVA (cerebral vascular accident) vs just benign vertigo. Active Problems:    Essential hypertension (10/18/2019)      Other hyperlipidemia (10/18/2019)        Plan:     1. S/p TPA, as was recommended by teleneurology -- TPA protocol in ICU. 2.  Brain MRI, ECHO. 3.  Neurology to see. 4.  Permissive HTN. 5.  Meclizine for vertigo. 6.  SCD's for DVT prophylaxis. 7.  PT/OT/ST.         Signed By: Tio Huang MD     October 18, 2019

## 2019-10-18 NOTE — PROGRESS NOTES
1410- TRANSFER - IN REPORT:    Verbal report received from Alirio Devine RN(name) on Wade Araya  being received from ED(unit) for routine progression of care      Report consisted of patients Situation, Background, Assessment and   Recommendations(SBAR). Information from the following report(s) SBAR, Kardex, ED Summary, Intake/Output, MAR, Accordion, Recent Results, Med Rec Status, Cardiac Rhythm SR and Alarm Parameters  was reviewed with the receiving nurse. Opportunity for questions and clarification was provided. Assessment completed upon patients arrival to unit and care assumed.

## 2019-10-19 ENCOUNTER — APPOINTMENT (OUTPATIENT)
Dept: MRI IMAGING | Age: 49
End: 2019-10-19
Attending: INTERNAL MEDICINE
Payer: COMMERCIAL

## 2019-10-19 LAB
ALBUMIN SERPL-MCNC: 3.6 G/DL (ref 3.5–5)
ALBUMIN/GLOB SERPL: 1 {RATIO} (ref 1.1–2.2)
ALP SERPL-CCNC: 56 U/L (ref 45–117)
ALT SERPL-CCNC: 30 U/L (ref 12–78)
ANION GAP SERPL CALC-SCNC: 4 MMOL/L (ref 5–15)
AST SERPL-CCNC: 21 U/L (ref 15–37)
BASOPHILS # BLD: 0 K/UL (ref 0–0.1)
BASOPHILS NFR BLD: 0 % (ref 0–1)
BILIRUB SERPL-MCNC: 0.6 MG/DL (ref 0.2–1)
BUN SERPL-MCNC: 19 MG/DL (ref 6–20)
BUN/CREAT SERPL: 22 (ref 12–20)
CALCIUM SERPL-MCNC: 8.7 MG/DL (ref 8.5–10.1)
CHLORIDE SERPL-SCNC: 108 MMOL/L (ref 97–108)
CO2 SERPL-SCNC: 26 MMOL/L (ref 21–32)
CREAT SERPL-MCNC: 0.88 MG/DL (ref 0.7–1.3)
DIFFERENTIAL METHOD BLD: NORMAL
EOSINOPHIL # BLD: 0.1 K/UL (ref 0–0.4)
EOSINOPHIL NFR BLD: 1 % (ref 0–7)
ERYTHROCYTE [DISTWIDTH] IN BLOOD BY AUTOMATED COUNT: 14.3 % (ref 11.5–14.5)
FACT VIII ACT/NOR PPP: 160 % (ref 80–200)
GLOBULIN SER CALC-MCNC: 3.6 G/DL (ref 2–4)
GLUCOSE SERPL-MCNC: 113 MG/DL (ref 65–100)
HCT VFR BLD AUTO: 39.6 % (ref 36.6–50.3)
HGB BLD-MCNC: 12.6 G/DL (ref 12.1–17)
IMM GRANULOCYTES # BLD AUTO: 0 K/UL (ref 0–0.04)
IMM GRANULOCYTES NFR BLD AUTO: 0 % (ref 0–0.5)
LYMPHOCYTES # BLD: 2.1 K/UL (ref 0.8–3.5)
LYMPHOCYTES NFR BLD: 29 % (ref 12–49)
MCH RBC QN AUTO: 27.6 PG (ref 26–34)
MCHC RBC AUTO-ENTMCNC: 31.8 G/DL (ref 30–36.5)
MCV RBC AUTO: 86.7 FL (ref 80–99)
MONOCYTES # BLD: 0.8 K/UL (ref 0–1)
MONOCYTES NFR BLD: 11 % (ref 5–13)
NEUTS SEG # BLD: 4.3 K/UL (ref 1.8–8)
NEUTS SEG NFR BLD: 59 % (ref 32–75)
NRBC # BLD: 0 K/UL (ref 0–0.01)
NRBC BLD-RTO: 0 PER 100 WBC
PLATELET # BLD AUTO: 212 K/UL (ref 150–400)
PMV BLD AUTO: 10.8 FL (ref 8.9–12.9)
POTASSIUM SERPL-SCNC: 4.1 MMOL/L (ref 3.5–5.1)
PROT SERPL-MCNC: 7.2 G/DL (ref 6.4–8.2)
RBC # BLD AUTO: 4.57 M/UL (ref 4.1–5.7)
SODIUM SERPL-SCNC: 138 MMOL/L (ref 136–145)
WBC # BLD AUTO: 7.3 K/UL (ref 4.1–11.1)

## 2019-10-19 PROCEDURE — 74011250637 HC RX REV CODE- 250/637: Performed by: PSYCHIATRY & NEUROLOGY

## 2019-10-19 PROCEDURE — 85307 ASSAY ACTIVATED PROTEIN C: CPT

## 2019-10-19 PROCEDURE — 65610000006 HC RM INTENSIVE CARE

## 2019-10-19 PROCEDURE — 36415 COLL VENOUS BLD VENIPUNCTURE: CPT

## 2019-10-19 PROCEDURE — 70553 MRI BRAIN STEM W/O & W/DYE: CPT

## 2019-10-19 PROCEDURE — 85613 RUSSELL VIPER VENOM DILUTED: CPT

## 2019-10-19 PROCEDURE — 97165 OT EVAL LOW COMPLEX 30 MIN: CPT

## 2019-10-19 PROCEDURE — 97530 THERAPEUTIC ACTIVITIES: CPT

## 2019-10-19 PROCEDURE — 85210 CLOT FACTOR II PROTHROM SPEC: CPT

## 2019-10-19 PROCEDURE — 81241 F5 GENE: CPT

## 2019-10-19 PROCEDURE — 80053 COMPREHEN METABOLIC PANEL: CPT

## 2019-10-19 PROCEDURE — 86147 CARDIOLIPIN ANTIBODY EA IG: CPT

## 2019-10-19 PROCEDURE — 97161 PT EVAL LOW COMPLEX 20 MIN: CPT

## 2019-10-19 PROCEDURE — 97116 GAIT TRAINING THERAPY: CPT

## 2019-10-19 PROCEDURE — 85306 CLOT INHIBIT PROT S FREE: CPT

## 2019-10-19 PROCEDURE — 86146 BETA-2 GLYCOPROTEIN ANTIBODY: CPT

## 2019-10-19 PROCEDURE — 85303 CLOT INHIBIT PROT C ACTIVITY: CPT

## 2019-10-19 PROCEDURE — 85025 COMPLETE CBC W/AUTO DIFF WBC: CPT

## 2019-10-19 PROCEDURE — 74011250636 HC RX REV CODE- 250/636: Performed by: INTERNAL MEDICINE

## 2019-10-19 PROCEDURE — 85240 CLOT FACTOR VIII AHG 1 STAGE: CPT

## 2019-10-19 PROCEDURE — 85300 ANTITHROMBIN III ACTIVITY: CPT

## 2019-10-19 PROCEDURE — A9575 INJ GADOTERATE MEGLUMI 0.1ML: HCPCS | Performed by: INTERNAL MEDICINE

## 2019-10-19 RX ORDER — GADOTERATE MEGLUMINE 376.9 MG/ML
19 INJECTION INTRAVENOUS
Status: COMPLETED | OUTPATIENT
Start: 2019-10-19 | End: 2019-10-19

## 2019-10-19 RX ORDER — SODIUM CHLORIDE 0.9 % (FLUSH) 0.9 %
SYRINGE (ML) INJECTION
Status: COMPLETED
Start: 2019-10-19 | End: 2019-10-19

## 2019-10-19 RX ORDER — ATORVASTATIN CALCIUM 40 MG/1
80 TABLET, FILM COATED ORAL DAILY
Status: DISCONTINUED | OUTPATIENT
Start: 2019-10-19 | End: 2019-10-20

## 2019-10-19 RX ORDER — ATORVASTATIN CALCIUM 40 MG/1
80 TABLET, FILM COATED ORAL DAILY
Status: DISCONTINUED | OUTPATIENT
Start: 2019-10-20 | End: 2019-10-19

## 2019-10-19 RX ORDER — SODIUM CHLORIDE 0.9 % (FLUSH) 0.9 %
10 SYRINGE (ML) INJECTION
Status: COMPLETED | OUTPATIENT
Start: 2019-10-19 | End: 2019-10-19

## 2019-10-19 RX ORDER — ASPIRIN 81 MG/1
81 TABLET ORAL DAILY
Status: DISCONTINUED | OUTPATIENT
Start: 2019-10-19 | End: 2019-10-21 | Stop reason: HOSPADM

## 2019-10-19 RX ADMIN — GADOTERATE MEGLUMINE 19 ML: 376.9 INJECTION INTRAVENOUS at 10:00

## 2019-10-19 RX ADMIN — Medication 10 ML: at 10:00

## 2019-10-19 RX ADMIN — ASPIRIN 81 MG: 81 TABLET, COATED ORAL at 18:41

## 2019-10-19 RX ADMIN — ATORVASTATIN CALCIUM 80 MG: 40 TABLET, FILM COATED ORAL at 20:49

## 2019-10-19 RX ADMIN — Medication 10 ML: at 23:07

## 2019-10-19 NOTE — PROGRESS NOTES
PHYSICAL THERAPY EVALUATION WITH DISCHARGE  Patient: Sonido Ann (38 y.o. male)  Date: 10/19/2019  Primary Diagnosis: CVA (cerebral vascular accident) [facility]) [I63.9]       Precautions:          ASSESSMENT  Based on the objective data described below, the patient presents with intact strength, balance, and mobility at baseline following admission for CVA. Ct scan negative for acute process and MRI positive for two small foci of acute ischemia in the right parieto-occipital lobe with corresponding rim of subtle susceptibility artifact potentially representing petechial hemorrhage. Patient is at baseline for mobility including independence with gait around unit. He does continue to report a woozy feeling with head turns to the L although no LOB when performed dynamically during gait. Encouraged patient to continue practicing head turns L and R sitting and while ambulating for habituation exercise. He has no acute skilled PT needs at this time. Functional Outcome Measure: The patient scored Total: 56/56 on the Mello Balance Assessment which is indicative of low fall risk. Other factors to consider for discharge: None     Further skilled acute physical therapy is not indicated at this time. PLAN :  Recommendation for discharge: (in order for the patient to meet his/her long term goals)  No skilled physical therapy/ follow up rehabilitation needs identified at this time. This discharge recommendation:  Has not yet been discussed the attending provider and/or case management    Equipment recommendations for successful discharge: none         SUBJECTIVE:   Patient stated I'm worlds better than yesterday.     OBJECTIVE DATA SUMMARY:   HISTORY:    Past Medical History:   Diagnosis Date    Essential hypertension 10/18/2019    Hypercholesterolemia     Ill-defined condition     sacrcoidosis    Neurological disorder     herniated disc    Other hyperlipidemia 10/18/2019    Sarcoidosis      Past Surgical History:   Procedure Laterality Date    HX ORTHOPAEDIC      thumb    HX OTHER SURGICAL      jose cyst       Prior level of function: independent,   Personal factors and/or comorbidities impacting plan of care:     Home Situation  Home Environment: Private residence  # Steps to Enter: 5  Rails to Enter: Yes  Hand Rails : Bilateral  One/Two Story Residence: Two story  # of Interior Steps: 21  Interior Rails: Left  Living Alone: No  Support Systems: Family member(s)  Current DME Used/Available at Home: None    EXAMINATION/PRESENTATION/DECISION MAKING:   Critical Behavior:  Neurologic State: Alert  Orientation Level: Oriented X4  Cognition: Appropriate for age attention/concentration, Appropriate safety awareness, Appropriate decision making, Follows commands  Safety/Judgement: Awareness of environment, Insight into deficits  Hearing:     Skin:    Edema:   Range Of Motion:  AROM: Within functional limits           PROM: Within functional limits           Strength:    Strength:  Within functional limits        RLE Strength  R Hip Flexion: 5  R Knee Flexion: 5  R Knee Extension: 5  R Ankle Dorsiflexion: 5  R Ankle Plantar Flexion: 5        LLE Strength  L Hip Flexion: 5  L Knee Flexion: 5  L Knee Extension: 5  L Ankle Dorsiflexion: 5  L Ankle Plantar Flexion: 5  Tone & Sensation:   Tone: Normal              Sensation: Intact               Coordination:  Coordination: Within functional limits  Vision:   Tracking: Able to track stimulus in all quadrants w/o difficulty(mild multidirectional nystagus when tracking R>L)  Visual Fields: (able to detect in all fields)  Diplopia: No  Acuity: Within Defined Limits  Functional Mobility:  Bed Mobility:     Supine to Sit: (received up in chair)        Transfers:  Sit to Stand: Independent  Stand to Sit: Independent  Stand Pivot Transfers: Independent                    Balance:   Sitting: Intact  Standing: Intact  Ambulation/Gait Training:  Distance (ft): 350 Feet (ft)  Assistive Device: Gait belt  Ambulation - Level of Assistance: Independent                                                Stairs: Therapeutic Exercises:       Functional Measure  Mello Balance Test:    Sitting to Standin  Standing Unsupported: 4  Sitting with Back Unsupported: 4  Standing to Sittin  Transfers: 4  Standing Unsupported with Eyes Closed: 4  Standing Unsupported with Feet Together: 4  Reach Forward with Outstretched Arm: 4   Object: 4  Turn to Look Over Shoulders: 4  Turn 360 Degrees: 4  Alternate Foot on Step/Stool: 4  Standing Unsupported One Foot in Front: 4  Stand on One Le  Total: 56/56         56=Maximum possible score;   0-20=High fall risk  21-40=Moderate fall risk   41-56=Low fall risk         Activity Tolerance:   Good  Please refer to the flowsheet for vital signs taken during this treatment. After treatment patient left in no apparent distress:   Sitting in chair, Call bell within reach and Caregiver / family present    COMMUNICATION/EDUCATION:   The patients plan of care was discussed with: Occupational Therapist and Registered Nurse. Patient and/or family was verbally educated on the BE FAST acronym for signs/symptoms of CVA and TIA. BE FAST was written on patient's communication board  for visual education and reinforcement. All questions answered with patient indicating good understanding. Fall prevention education was provided and the patient/caregiver indicated understanding.     Thank you for this referral.  Omar Orellana, PT   Time Calculation: 19 mins

## 2019-10-19 NOTE — ROUTINE PROCESS
Bedside and Verbal shift change report given to [address] (oncoming nurse) by Enmanuel Costa (offgoing nurse).  Report included the following information SBAR, Kardex, Intake/Output, Recent Results and Cardiac Rhythm SR.

## 2019-10-19 NOTE — PROGRESS NOTES
Medical Progress Note      NAME: Tatiana Reyez   :  1970  MRM:  [id number]    Date/Time: 10/19/2019  11:32 AM         Problem List:     Principal Problem:    CVA (cerebral vascular accident) ([address] (10/18/2019)    Active Problems:    Essential hypertension (10/18/2019)      Other hyperlipidemia (10/18/2019)             Subjective:     Patient on his way to MRI- has some positional vertigo    Past Medical History:   Diagnosis Date    Essential hypertension 10/18/2019    Hypercholesterolemia     Ill-defined condition     sacrcoidosis    Neurological disorder     herniated disc    Other hyperlipidemia 10/18/2019    Sarcoidosis        ROS:  General: negative for fever, chills, sweats, weakness  Respiratory:  negative for cough, sputum production, SOB, wheezing, THORNTON, pleuritic pain  Cardiology:  negative for chest pain, palpitations, orthopnea, PND, edema, syncope   Neurological: dizziness turning his head         Objective:       Vitals:          Last 24hrs VS reviewed since prior progress note. Most recent are:    Visit Vitals  /75   Pulse 72   Temp 98.6 °F (37 °C)   Resp 13   Wt 192 lb 14.4 oz (87.5 kg)   SpO2 98%   BMI 25.45 kg/m²     SpO2 Readings from Last 6 Encounters:   10/19/19 98%   11/03/15 100%   13 99%   11 99%            Intake/Output Summary (Last 24 hours) at 10/19/2019 1132  Last data filed at 10/19/2019 0800  Gross per 24 hour   Intake 50 ml   Output 850 ml   Net -800 ml          Exam:     General   well developed, well nourished, appears stated age, in no acute distress  Respiratory   Clear To Auscultation bilaterally - no wheezes, rales, rhonchi, or crackles  Cardiology  regular  Abdominal  Soft, non-tender, non-distended, positive bowel sounds, no hepatosplenomegaly  Extremities  No clubbing, cyanosis, or edema. Pulses intact.   Neuro- non focal    Lab Data Reviewed: (see below)    Medications Reviewed: (see below)    ______________________________________________________________________    Medications:     Current Facility-Administered Medications   Medication Dose Route Frequency    ondansetron (ZOFRAN) injection 4 mg  4 mg IntraVENous Q6H PRN    acetaminophen (TYLENOL) tablet 650 mg  650 mg Oral Q4H PRN    cloNIDine HCl (CATAPRES) tablet 0.1 mg  0.1 mg Oral Q4H PRN    meclizine (ANTIVERT) tablet 25 mg  25 mg Oral Q4H PRN            Lab Review:     Recent Labs     10/19/19  0800 10/18/19  0922   WBC 7.3 9.7   HGB 12.6 13.3   HCT 39.6 42.4    213     Recent Labs     10/19/19  0800 10/18/19  0922    139   K 4.1 3.9    107   CO2 26 25   * 116*   BUN 19 22*   CREA 0.88 0.88   CA 8.7 9.2   ALB 3.6 4.0   TBILI 0.6 0.6   SGOT 21 24   ALT 30 31   INR  --  0.9     No results found for: GLUCPOC  No results for input(s): PH, PCO2, PO2, HCO3, FIO2 in the last 72 hours. Recent Labs     10/18/19  0922   INR 0.9       Other pertinent lab: NA         Assessment:     Patient Active Problem List   Diagnosis Code    Sarcoidosis D86.9    CVA (cerebral vascular accident) ([facility] [address] I63.9    Essential hypertension I10    Other hyperlipidemia E78.49          Plan:                 1. Dizziness- ?bpv.   Repeat mri a/p tpa yesterday                ___________________________________________________    Attending Physician: Efrain Aguilar MD

## 2019-10-19 NOTE — PROGRESS NOTES
Reason for Admission: CVA                    RRAT Score: 1- LOW                    Plan for utilizing home health: TBD                        Current Advanced Directive/Advance Care Plan: Pt has an AMD on file. Transition of Care Plan: CM met with pt at bedside to discuss CM role and to assess pt needs. CM verified demographics including insurance and emergency contact information. Pt lives with spouse and daughter in a private residence. Transportation: Pt spouse, Velma Cantrell, reports she will provide transportation home after discharge. Pt reports no DME use and IADLs prior to admission. Pt uses [facility] on [address] and reports no concerns with getting medications. Pt verified PCP and reports last visit was 08/07/2019. Pt reports no concerns for discharge at this time. CM to follow and assist with disposition needs as they arise. Care Management Interventions  PCP Verified by CM: Yes  Last Visit to PCP: 08/07/19  Palliative Care Criteria Met (RRAT>21 & CHF Dx)?: No  Mode of Transport at Discharge: Other (see comment)(Spouse, Velma Cantrell)  Transition of Care Consult (CM Consult):  Other(Initial Assessment)  MyChart Signup: No  Discharge Durable Medical Equipment: No  Physical Therapy Consult: Yes  Occupational Therapy Consult: Yes  Speech Therapy Consult: Yes  Current Support Network: Lives with Spouse, Own Home  Confirm Follow Up Transport: Family  Plan discussed with Pt/Family/Caregiver: Yes  Discharge Location  Discharge Placement: Home(Disposition TBD/subject to chnage pending care recommendations)    Libby Myers RN, BSN  Care Management Department

## 2019-10-19 NOTE — PROGRESS NOTES
Progress Note    Patient: Tristin Wiggins MRN: [id number]  SSN: [id number]    YOB: 1970  Age: 52 y.o.   Sex: male      Admit Date: 10/18/2019    LOS: 1 day     Subjective:     No acute events overnight, dizziness is intermittent    Objective:     Vitals:    10/18/19 2300 10/19/19 0000 10/19/19 0100 10/19/19 0200   BP: 105/61 106/72 96/57 112/67   Pulse: 82 79 70 72   Resp: 17 17 15 16   Temp:  98.5 °F (36.9 °C)     SpO2: 97% 95% 97% 96%   Weight:            Intake and Output:  Current Shift: 10/18 1901 - 10/19 0700  In: -   Out: 400 [Urine:400]  Last three shifts: 10/17 0701 - 10/18 1900  In: 150 [I.V.:150]  Out: -     Physical Exam:   Lungs: CTA bilaterally  Heart: RRR, no m/r/g  Skin warm and dry, cap refill: <2 seconds  No bleeding or hematoma     Neuro Exam:   Sleeping, woken to voice, oriented x3  Following commands  PERRL 4 mm EOM: multi-directional nystagmus bilaterally; face symmterical  Motor exam, strength:   RUE: 5/5 LUE: 5/5  RLE: 5/5 LLE: 5/5  Misha's: negative  Pronator Drift: absent  Clonus: negative  Babinski: downward bilaterally     Lab/Data Review:    Recent Results (from the past 24 hour(s))   EKG, 12 LEAD, INITIAL    Collection Time: 10/18/19  9:08 AM   Result Value Ref Range    Ventricular Rate 79 BPM    Atrial Rate 79 BPM    P-R Interval 134 ms    QRS Duration 92 ms    Q-T Interval 378 ms    QTC Calculation (Bezet) 433 ms    Calculated P Axis 68 degrees    Calculated R Axis 20 degrees    Calculated T Axis -9 degrees    Diagnosis       Normal sinus rhythm  Non-specific ST & T wave changes  When compared with ECG of 03-NOV-2015 12:39,  No significant change was found  Confirmed by Christofer Roberson M.D., Dheeraj Cee ([id number]) on 10/18/2019 9:34:54 AM     CBC WITH AUTOMATED DIFF    Collection Time: 10/18/19  9:22 AM   Result Value Ref Range    WBC 9.7 4.1 - 11.1 K/uL    RBC 4.85 4.10 - 5.70 M/uL    HGB 13.3 12.1 - 17.0 g/dL    HCT 42.4 36.6 - 50.3 %    MCV 87.4 80.0 - 99.0 FL    MCH 27.4 26.0 - 34.0 PG    MCHC 31.4 30.0 - 36.5 g/dL    RDW 14.1 11.5 - 14.5 %    PLATELET 470 267 - 902 K/uL    MPV 10.7 8.9 - 12.9 FL    NRBC 0.0 0  WBC    ABSOLUTE NRBC 0.00 0.00 - 0.01 K/uL    NEUTROPHILS 65 32 - 75 %    LYMPHOCYTES 26 12 - 49 %    MONOCYTES 8 5 - 13 %    EOSINOPHILS 1 0 - 7 %    BASOPHILS 0 0 - 1 %    IMMATURE GRANULOCYTES 0 0.0 - 0.5 %    ABS. NEUTROPHILS 6.3 1.8 - 8.0 K/UL    ABS. LYMPHOCYTES 2.5 0.8 - 3.5 K/UL    ABS. MONOCYTES 0.8 0.0 - 1.0 K/UL    ABS. EOSINOPHILS 0.1 0.0 - 0.4 K/UL    ABS. BASOPHILS 0.0 0.0 - 0.1 K/UL    ABS. IMM. GRANS. 0.0 0.00 - 0.04 K/UL    DF AUTOMATED     METABOLIC PANEL, COMPREHENSIVE    Collection Time: 10/18/19  9:22 AM   Result Value Ref Range    Sodium 139 136 - 145 mmol/L    Potassium 3.9 3.5 - 5.1 mmol/L    Chloride 107 97 - 108 mmol/L    CO2 25 21 - 32 mmol/L    Anion gap 7 5 - 15 mmol/L    Glucose 116 (H) 65 - 100 mg/dL    BUN 22 (H) 6 - 20 MG/DL    Creatinine 0.88 0.70 - 1.30 MG/DL    BUN/Creatinine ratio 25 (H) 12 - 20      GFR est AA >60 >60 ml/min/1.73m2    GFR est non-AA >60 >60 ml/min/1.73m2    Calcium 9.2 8.5 - 10.1 MG/DL    Bilirubin, total 0.6 0.2 - 1.0 MG/DL    ALT (SGPT) 31 12 - 78 U/L    AST (SGOT) 24 15 - 37 U/L    Alk. phosphatase 63 45 - 117 U/L    Protein, total 8.2 6.4 - 8.2 g/dL    Albumin 4.0 3.5 - 5.0 g/dL    Globulin 4.2 (H) 2.0 - 4.0 g/dL    A-G Ratio 1.0 (L) 1.1 - 2.2     PROTHROMBIN TIME + INR    Collection Time: 10/18/19  9:22 AM   Result Value Ref Range    INR 0.9 0.9 - 1.1      Prothrombin time 9.5 9.0 - 11.1 sec   TROPONIN I    Collection Time: 10/18/19  9:22 AM   Result Value Ref Range    Troponin-I, Qt. <0.05 <0.05 ng/mL   SAMPLES BEING HELD    Collection Time: 10/18/19  9:22 AM   Result Value Ref Range    SAMPLES BEING HELD  1 RED     COMMENT        Add-on orders for these samples will be processed based on acceptable specimen integrity and analyte stability, which may vary by analyte.    ECHO ADULT COMPLETE    Collection Time: 10/18/19  4:16 PM   Result Value Ref Range    LVIDd 4.57 4.2 - 5.9 cm    LVPWd 1.00 0.6 - 1.0 cm    LVIDs 2.57 cm    IVSd 1.05 (A) 0.6 - 1.0 cm    MV E Will 48.66 cm/s    LV Mass .8 88 - 224 g    Left Atrium Major Axis 2.98 cm    Triscuspid Valve Regurgitation Peak Gradient 18.3 mmHg    TR Max Velocity 213.85 cm/s       Assessment:     Principal Problem:    CVA (cerebral vascular accident) ([facility] [address] (10/18/2019)    Active Problems:    Essential hypertension (10/18/2019)      Other hyperlipidemia (10/18/2019)        Plan:     Continue medical management per Hospitalist Team  Repeat HCT this morning per tPA protocol  Meclizine for vertigo    Further recommendations pending full evaluation and pending imaging by Dr. Kim Moreno       Signed By: LOURDES Davis     October 19, 2019

## 2019-10-19 NOTE — PROGRESS NOTES
1930  Bedside shift change report given to Inez Allen RN (oncoming nurse) by Misty Orr (offgoing nurse). Report included the following information SBAR, Kardex, ED Summary, Intake/Output, MAR, Recent Results and Cardiac Rhythm NSR. Shift Summary  Pt had an uneventful night. Wife and daughter were at the bedside for 2 hours last night. Pt remains on Q1 hour neuro checks with dizziness resolving slowly. Pt is on room air. Pt has 2 saline locked IV's. Pt is resting comfortably without complaints. Pt is neurologically intact.

## 2019-10-19 NOTE — PROGRESS NOTES
OCCUPATIONAL THERAPY EVALUATION/DISCHARGE  Patient: Freya Murray (56 y.o. male)  Date: 10/19/2019  Primary Diagnosis: CVA (cerebral vascular accident) [facility]) [I63.9]       Precautions: none       ASSESSMENT  Based on the objective data described below, the patient presents with mild dizziness with mobility (rated up to 2/5, reports significantly improved since yesterday), and mild multidirectional nystagmus (when tracking R>L), but patient currently functioning at independent level for functional mobility and ADLs. Noted MRI positive for 2 small foci of acute ischemia in the right parieto-occipital lobe. Vision, sensation, perception, balance all intact. BUE AROM/ strength/ coordination equal and intact. Patient receptive to [facility] education on signs/ symptoms of CVA and provided with handout. Patient has no additional OT needs. Recommend d/c home when medically stable. Current Level of Function (ADLs/self-care): independent    Functional Outcome Measure: The patient scored Total A-D  Total A-D (Motor Function): 66/66 on the Fugl-Green Assessment with BUE which is indicative of no impairment in upper extremity functional status. PLAN :  Recommendation for discharge: (in order for the patient to meet his/her long term goals)  No skilled occupational therapy/ follow up rehabilitation needs identified at this time. This discharge recommendation:  Has not yet been discussed the attending provider and/or case management       SUBJECTIVE:   Patient stated I'm ready to get out of here.     OBJECTIVE DATA SUMMARY:   HISTORY:   Past Medical History:   Diagnosis Date    Essential hypertension 10/18/2019    Hypercholesterolemia     Ill-defined condition     sacrcoidosis    Neurological disorder     herniated disc    Other hyperlipidemia 10/18/2019    Sarcoidosis      Past Surgical History:   Procedure Laterality Date    HX ORTHOPAEDIC      thumb    HX OTHER SURGICAL      jose cyst       Prior Level of Function/Environment/Context: independent, driving, active, working as a   Expanded or extensive additional review of patient history:     Home Situation  Home Environment: Private residence  # Steps to Enter: 5  Rails to Enter: Yes  Hand Rails : Bilateral  One/Two Story Residence: Two story  # of Interior Steps: 21  Interior Rails: Left  Living Alone: No  Support Systems: Family member(s)  Current DME Used/Available at Home: None    Hand dominance: Right    EXAMINATION OF PERFORMANCE DEFICITS:  Cognitive/Behavioral Status:  Neurologic State: Alert  Orientation Level: Oriented X4  Cognition: Appropriate for age attention/concentration; Appropriate safety awareness; Appropriate decision making; Follows commands  Perception: Appears intact  Perseveration: No perseveration noted  Safety/Judgement: Awareness of environment; Insight into deficits    Skin: visible skin appears intact    Edema: none noted    Hearing:       Vision/Perceptual:    Tracking: Able to track stimulus in all quadrants w/o difficulty(mild multidirectional nystagus when tracking R>L)              Visual Fields: (able to detect in all fields)  Diplopia: No    Acuity: Within Defined Limits         Range of Motion:    AROM: Within functional limits  PROM: Within functional limits                      Strength:    Strength: Within functional limits                Coordination:  Coordination: Within functional limits  Fine Motor Skills-Upper: Left Intact; Right Intact    Gross Motor Skills-Upper: Left Intact; Right Intact    Tone & Sensation:    Tone: Normal  Sensation: Intact                      Balance:  Sitting: Intact  Standing: Intact    Functional Mobility and Transfers for ADLs:  Bed Mobility:  Supine to Sit: (received up in chair)    Transfers:  Sit to Stand: Independent  Stand to Sit: Independent    ADL Assessment:  Feeding: Independent    Oral Facial Hygiene/Grooming: Independent    Bathing: Independent    Upper Body Dressing: Independent    Lower Body Dressing: Independent    Toileting: Independent                ADL Intervention and task modifications:             Cognitive Retraining  Safety/Judgement: Awareness of environment; Insight into deficits    Functional Measure:  Fugl-Green Assessment of Motor Recovery after Stroke:   Reflex Activity  Flexors/Biceps/Fingers: Can be elicited  Extensors/Triceps: Can be elicited  Reflex Subtotal: 4    Volitional Movement Within Synergies  Shoulder Retraction: Full  Shoulder Elevation: Full  Shoulder Abduction (90 degrees): Full  Shoulder External Rotation: Full  Elbow Flexion: Full  Forearm Supination: Full  Shoulder Adduction/Internal Rotation: Full  Elbow Extension: Full  Forearm Pronation: Full  Subtotal: 18    Volitional Movement Mixing Synergies  Hand to Lumbar Spine: Full  Shoulder Flexion (0-90 degrees): Full  Pronation-Supination: Full  Subtotal: 6    Volitional Movement With Little or No Synergy  Shoulder Abduction (0-90 degrees): Full  Shoulder Flexion ( degrees): Full  Pronation/Supination: Full  Subtotal : 6    Normal Reflex Activity  Biceps, Triceps, Finger Flexors: Full  Subtotal : 2    Upper Extremity Total   Upper Extremity Total: 36    Wrist  Stability at 15 Degree Dorsiflexion: Full  Repeated Dorsiflexion/ Volar Flexion: Full  Stability at 15 Degree Dorsiflexion: Full  Repeated Dorsiflexion/ Volar Flexion: Full  Circumduction: Full  Wrist Total: 10    Hand  Mass Flexion: Full  Mass Extension: Full  Grasp A: Full  Grasp B: Full  Grasp C: Full  Grasp D: Full  Grasp E: Full  Hand Total: 14    Coordination/Speed  Tremor: None  Dysmetria: None  Time: <1s  Coordination/Speed Total : 6    Total A-D  Total A-D (Motor Function): 66/66     This is a reliable/valid measure of arm function after a neurological event. It has established value to characterize functional status and for measuring spontaneous and therapy-induced recovery; tests proximal and distal motor functions. Fugl-Green Assessment  UE scores recorded between five and 30 days post neurologic event can be used to predict UE recovery at six months post neurologic event. Severe = 0-21 points   Moderately Severe = 22-33 points   Moderate = 34-47 points   Mild = 48-66 points  WYATT Hollins, RAFI La, & TOD Bermeo (1992). Measurement of motor recovery after stroke: Outcome assessment and sample size requirements.  Stroke, 23, pp. 6493-2647.   ------------------------------------------------------------------------------------------------------------------------------------------------------------------  MCID:  Stroke:   Carlos Mora et al, 2001; n = 171; mean age 79 (6) years; assessed within 16 (12) days of stroke, Acute Stroke)  FMA Motor Scores from Admission to Discharge   10 point increase in FMA Upper Extremity = 1.5 change in discharge FIM   10 point increase in FMA Lower Extremity = 1.9 change in discharge FIM  MDC:   Stroke:   Kim Jose et al, 2008, n = 14, mean age = 59.9 (14.6) years, assessed on average 14 (6.5) months post stroke, Chronic Stroke)   FMA = 5.2 points for the Upper Extremity portion of the assessment     Occupational Therapy Evaluation Charge Determination   History Examination Decision-Making   LOW Complexity : Brief history review  LOW Complexity : 1-3 performance deficits relating to physical, cognitive , or psychosocial skils that result in activity limitations and / or participation restrictions  LOW Complexity : No comorbidities that affect functional and no verbal or physical assistance needed to complete eval tasks       Based on the above components, the patient evaluation is determined to be of the following complexity level: LOW   Pain Rating:  Patient reports no pain    Activity Tolerance:   VSS    After treatment patient left in no apparent distress:    Sitting in chair  Family present  Call bell left within reach  RN notified    COMMUNICATION/EDUCATION:   The patients plan of care was discussed with: Physical Therapist and Registered Nurse. Patient was educated regarding his deficit(s) of dizziness as this relates to his diagnosis of CVA. He demonstrated Good understanding as evidenced by verbalization. Patient and/or family was verbally educated on the BE FAST acronym for signs/symptoms of CVA and TIA. Provided with BEFAST handout. All questions answered with patient indicating good understanding.      Thank you for this referral.  Mali Jones, OT  Time Calculation: 19 mins

## 2019-10-19 NOTE — PROGRESS NOTES
PCCM      Chart reviewed  S/p TPA  Off floor for repeat imaging    D/w RN  available as needed for acute ICU issues      Atilio

## 2019-10-20 PROCEDURE — 99218 HC RM OBSERVATION: CPT

## 2019-10-20 PROCEDURE — 74011250636 HC RX REV CODE- 250/636: Performed by: INTERNAL MEDICINE

## 2019-10-20 PROCEDURE — 74011250637 HC RX REV CODE- 250/637: Performed by: PSYCHIATRY & NEUROLOGY

## 2019-10-20 PROCEDURE — 65660000000 HC RM CCU STEPDOWN

## 2019-10-20 PROCEDURE — 74011250637 HC RX REV CODE- 250/637: Performed by: INTERNAL MEDICINE

## 2019-10-20 RX ORDER — ATENOLOL 25 MG/1
25 TABLET ORAL DAILY
Status: DISCONTINUED | OUTPATIENT
Start: 2019-10-21 | End: 2019-10-21

## 2019-10-20 RX ORDER — ROSUVASTATIN CALCIUM 40 MG/1
40 TABLET, COATED ORAL
Status: DISCONTINUED | OUTPATIENT
Start: 2019-10-20 | End: 2019-10-21 | Stop reason: HOSPADM

## 2019-10-20 RX ADMIN — ASPIRIN 81 MG: 81 TABLET, COATED ORAL at 08:32

## 2019-10-20 RX ADMIN — MECLIZINE 25 MG: 12.5 TABLET ORAL at 04:35

## 2019-10-20 RX ADMIN — ROSUVASTATIN CALCIUM 40 MG: 40 TABLET, FILM COATED ORAL at 21:46

## 2019-10-20 NOTE — PROGRESS NOTES
Medical Progress Note      NAME: Benedicto Mora   :  1970  MRM:  [id number]    Date/Time: 10/20/2019  7:20 AM         Problem List:     Principal Problem:    CVA (cerebral vascular accident) ([address] (10/18/2019)    Active Problems:    Essential hypertension (10/18/2019)      Other hyperlipidemia (10/18/2019)             Subjective:     Patient still dizzy with movement of head position. Prefers crestor to lipitor. Past Medical History:   Diagnosis Date    Essential hypertension 10/18/2019    Hypercholesterolemia     Ill-defined condition     sacrcoidosis    Neurological disorder     herniated disc    Other hyperlipidemia 10/18/2019    Sarcoidosis        ROS:  General: negative for fever, chills, sweats, weakness  Respiratory:  negative for cough, sputum production, SOB, wheezing, THORNTON, pleuritic pain  Cardiology:  negative for chest pain, palpitations, orthopnea, PND, edema, syncope   Gastrointestinal: negative for abdominal pain, N/V, dysphagia, change in bowel habits, bleeding         Objective:       Vitals:          Last 24hrs VS reviewed since prior progress note. Most recent are:    Visit Vitals  /70   Pulse 65   Temp 98.5 °F (36.9 °C)   Resp 16   Wt 198 lb 10.2 oz (90.1 kg)   SpO2 97%   BMI 26.94 kg/m²     SpO2 Readings from Last 6 Encounters:   10/20/19 97%   11/03/15 100%   13 99%   11 99%            Intake/Output Summary (Last 24 hours) at 10/20/2019 0720  Last data filed at 10/20/2019 0500  Gross per 24 hour   Intake 2280 ml   Output 1625 ml   Net 655 ml          Exam:     General   well developed, well nourished, appears stated age, in no acute distress  Respiratory   Clear To Auscultation bilaterally - no wheezes, rales, rhonchi, or crackles  Cardiology  reg  Abdominal  Soft, non-tender, non-distended, positive bowel sounds, no hepatosplenomegaly  Extremities  No clubbing, cyanosis, or edema. Pulses intact.   Neurological  No focal neurological deficits noted    Lab Data Reviewed: (see below)    Medications Reviewed: (see below)    ______________________________________________________________________    Medications:     Current Facility-Administered Medications   Medication Dose Route Frequency    rosuvastatin (CRESTOR) tablet 40 mg  40 mg Oral QHS    aspirin delayed-release tablet 81 mg  81 mg Oral DAILY    ondansetron (ZOFRAN) injection 4 mg  4 mg IntraVENous Q6H PRN    acetaminophen (TYLENOL) tablet 650 mg  650 mg Oral Q4H PRN    cloNIDine HCl (CATAPRES) tablet 0.1 mg  0.1 mg Oral Q4H PRN    meclizine (ANTIVERT) tablet 25 mg  25 mg Oral Q4H PRN            Lab Review:     Recent Labs     10/19/19  0800 10/18/19  0922   WBC 7.3 9.7   HGB 12.6 13.3   HCT 39.6 42.4    213     Recent Labs     10/19/19  0800 10/18/19  0922    139   K 4.1 3.9    107   CO2 26 25   * 116*   BUN 19 22*   CREA 0.88 0.88   CA 8.7 9.2   ALB 3.6 4.0   TBILI 0.6 0.6   SGOT 21 24   ALT 30 31   INR  --  0.9     No results found for: GLUCPOC  No results for input(s): PH, PCO2, PO2, HCO3, FIO2 in the last 72 hours. Recent Labs     10/18/19  0922   INR 0.9       Other pertinent lab: NA         Assessment:     Patient Active Problem List   Diagnosis Code    Sarcoidosis D86.9    CVA (cerebral vascular accident) ([facility] [address] I63.9    Essential hypertension I10    Other hyperlipidemia E78.49          Plan:                 1. cva- s/p tpa.   ? Repeat head ct today/ per neuro  2. hyperchol- change to crestor per patient preference               ___________________________________________________    Attending Physician: Willis Matute MD

## 2019-10-20 NOTE — CONSULTS
10/19/2019    Cardiology Consult  Note   [facility] M[facility]   --------PCP:-Pat Lua MD   -----Subjective:   Thor Johnson is a 52 y.o. male  Consult requested from Dr Danielle Santos with new stroke  No prior CV hx    Had some mild sob but did not go for scheduled MARI last month  Denies chest pain, edema, syncope or shortness of breath at rest   Has no tachycardia , palpitations or sense of arrythmia   ATSP for fu monitoring    Prior cardiac history -denies      Discussion/Plan/Impression:    Stroke got tpa  Discussed with pt and wife on phone , options for loop or ILR and risk/benefit  Can be done as OP for cryptogenic stroke to look for afib   Currently NSR  Echo pending  Very high LDL needs high potency statin   Also OP stress nuclear to be arranged  Lab Results   Component Value Date/Time    Troponin-I, Qt. <0.05 10/18/2019 09:22 AM           Lab Results   Component Value Date/Time    Creatinine 0.88 10/19/2019 08:00 AM      Results for orders placed or performed during the hospital encounter of 10/18/19   EKG, 12 LEAD, INITIAL   Result Value Ref Range    Ventricular Rate 79 BPM    Atrial Rate 79 BPM    P-R Interval 134 ms    QRS Duration 92 ms    Q-T Interval 378 ms    QTC Calculation (Bezet) 433 ms    Calculated P Axis 68 degrees    Calculated R Axis 20 degrees    Calculated T Axis -9 degrees    Diagnosis       Normal sinus rhythm  Non-specific ST & T wave changes  When compared with ECG of 03-NOV-2015 12:39,  No significant change was found  Confirmed by Mariana Parks M.D., Josy Mcbride ([id number]) on 10/18/2019 9:34:54 AM     Results for orders placed or performed in visit on 08/07/19   AMB POC EKG ROUTINE W/ 12 LEADS, INTER & REP    Impression    NSR. WNL. No change compared to old EKG. Cardiac Studies/Hx:  No specialty comments available.    Medical history notable for  has a past medical history of Essential hypertension (10/18/2019), Hypercholesterolemia, Ill-defined condition, Neurological disorder, Other hyperlipidemia (10/18/2019), and Sarcoidosis. Social history notable for  reports that he has never smoked. He has never used smokeless tobacco. He reports that he drinks alcohol. Family history notable for family history includes Cancer in his mother; Hypertension in his father and mother; Stroke in his father. Past Medical History:   Diagnosis Date    Essential hypertension 10/18/2019    Hypercholesterolemia     Ill-defined condition     sacrcoidosis    Neurological disorder     herniated disc    Other hyperlipidemia 10/18/2019    Sarcoidosis         ROS-pertinents  negative except as above  The pertinent portions of the medical history,physician and nursing notes, meds,vitals , labs and Ins/Outs,are reviewed in the electronic record  Pt reports   numbness and the remainder of a complete multisystem 11 ROS  Are reviewed and negative    Social History     Tobacco Use    Smoking status: Never Smoker    Smokeless tobacco: Never Used   Substance Use Topics    Alcohol use: Yes     Comment: seldom    Drug use: Not on file      Family History   Problem Relation Age of Onset    Cancer Mother         Lung    Hypertension Mother     Stroke Father     Hypertension Father       Prior to Admission Medications   Prescriptions Last Dose Informant Patient Reported? Taking?   trandolapril (MAVIK) 4 mg tablet 10/18/2019 at am  No Yes   Sig: Take 1 Tab by mouth two (2) times a day. zolpidem (AMBIEN) 5 mg tablet   No Yes   Sig: Take 1 Tab by mouth nightly as needed for Sleep. Max Daily Amount: 5 mg.       Facility-Administered Medications: None     No Known Allergies   Objective:    Physical Exam:   Patient Vitals for the past 12 hrs:   Temp Pulse Resp BP SpO2   10/19/19 2000 98.6 °F (37 °C) 75 16 137/86 100 %   10/19/19 1400  74 17 105/67 99 %   10/19/19 1200 98.6 °F (37 °C) 76 13 121/80 100 %   10/19/19 1100  72 13 118/75    10/19/19 1021  75 18 (!) 135/96       General:  alert, cooperative, no distress, appears stated age   [de-identified], Neck:  NC,AT- no JVD   Chest Wall: inspection normal - no chest wall deformities or tenderness, respiratory effort normal   Lung:   clear to auscultation bilaterally   Heart:    normal rate, regular rhythm, normal S1, S2, no murmurs, rubs, clicks or gallops   Abdomen: nondistended   Extremities: extremities normal, atraumatic, no cyanosis or edema     Last 24hr Input/Output:    Intake/Output Summary (Last 24 hours) at 10/19/2019 2113  Last data filed at 10/19/2019 2000  Gross per 24 hour   Intake 1680 ml   Output 1450 ml   Net 230 ml        Data Review:   Recent Results (from the past 24 hour(s))   CBC WITH AUTOMATED DIFF    Collection Time: 10/19/19  8:00 AM   Result Value Ref Range    WBC 7.3 4.1 - 11.1 K/uL    RBC 4.57 4.10 - 5.70 M/uL    HGB 12.6 12.1 - 17.0 g/dL    HCT 39.6 36.6 - 50.3 %    MCV 86.7 80.0 - 99.0 FL    MCH 27.6 26.0 - 34.0 PG    MCHC 31.8 30.0 - 36.5 g/dL    RDW 14.3 11.5 - 14.5 %    PLATELET 089 995 - 264 K/uL    MPV 10.8 8.9 - 12.9 FL    NRBC 0.0 0  WBC    ABSOLUTE NRBC 0.00 0.00 - 0.01 K/uL    NEUTROPHILS 59 32 - 75 %    LYMPHOCYTES 29 12 - 49 %    MONOCYTES 11 5 - 13 %    EOSINOPHILS 1 0 - 7 %    BASOPHILS 0 0 - 1 %    IMMATURE GRANULOCYTES 0 0.0 - 0.5 %    ABS. NEUTROPHILS 4.3 1.8 - 8.0 K/UL    ABS. LYMPHOCYTES 2.1 0.8 - 3.5 K/UL    ABS. MONOCYTES 0.8 0.0 - 1.0 K/UL    ABS. EOSINOPHILS 0.1 0.0 - 0.4 K/UL    ABS. BASOPHILS 0.0 0.0 - 0.1 K/UL    ABS. IMM.  GRANS. 0.0 0.00 - 0.04 K/UL    DF AUTOMATED     METABOLIC PANEL, COMPREHENSIVE    Collection Time: 10/19/19  8:00 AM   Result Value Ref Range    Sodium 138 136 - 145 mmol/L    Potassium 4.1 3.5 - 5.1 mmol/L    Chloride 108 97 - 108 mmol/L    CO2 26 21 - 32 mmol/L    Anion gap 4 (L) 5 - 15 mmol/L    Glucose 113 (H) 65 - 100 mg/dL    BUN 19 6 - 20 MG/DL    Creatinine 0.88 0.70 - 1.30 MG/DL    BUN/Creatinine ratio 22 (H) 12 - 20      GFR est AA >60 >60 ml/min/1.73m2    GFR est non-AA >60 >60 ml/min/1.73m2    Calcium 8.7 8.5 - 10.1 MG/DL    Bilirubin, total 0.6 0.2 - 1.0 MG/DL    ALT (SGPT) 30 12 - 78 U/L    AST (SGOT) 21 15 - 37 U/L    Alk.  phosphatase 56 45 - 117 U/L    Protein, total 7.2 6.4 - 8.2 g/dL    Albumin 3.6 3.5 - 5.0 g/dL    Globulin 3.6 2.0 - 4.0 g/dL    A-G Ratio 1.0 (L) 1.1 - 2.2        Lab Results   Component Value Date/Time    Cholesterol, total 254 (H) 08/07/2019 11:01 AM    Cholesterol, total 270 (H) 09/19/2017 09:22 AM    Cholesterol, total 238 (H) 06/08/2015 09:58 AM    HDL Cholesterol 55 08/07/2019 11:01 AM    HDL Cholesterol 57 09/19/2017 09:22 AM    HDL Cholesterol 48 06/08/2015 09:58 AM    LDL, calculated 182 (H) 08/07/2019 11:01 AM    LDL, calculated 193 (H) 09/19/2017 09:22 AM    LDL, calculated 174 (H) 06/08/2015 09:58 AM    Triglyceride 84 08/07/2019 11:01 AM    Triglyceride 100 09/19/2017 09:22 AM    Triglyceride 79 06/08/2015 09:58 AM      May Phillips MD 10/19/2019

## 2019-10-20 NOTE — PROGRESS NOTES
10/20/2019   Christiano Anne MD  [facility]    . Reji Costadesurendra Reyes is a 52 y.o. male   Had dizziness and vertigo last night  Denies chest pain, edema, syncope or shortness of breath at rest   Tele ok  Discussion/Plans/Recs  Stroke got tpa  Plan  ILR  for cryptogenic stroke to look for afib   Currently NSR  Echo normal  Very high LDL needs high potency statin   Also OP stress nuclear to be arranged  Given more sx, will plan LUPE in am to r/o clot  Keep npo         Cardiac Studies/Hx:  No specialty comments available. Patient Vitals for the past 12 hrs:   Temp Pulse Resp BP SpO2   10/20/19 1355 97.6 °F (36.4 °C) 67 16 (!) 130/97 99 %   10/20/19 1243 98.5 °F (36.9 °C) 67 17 135/86 100 %   10/20/19 1000  (!) 101 21 129/70 97 %   10/20/19 0900  (!) 104 25 (!) 139/91 99 %   10/20/19 0800 98.1 °F (36.7 °C) 70 15 98/66 95 %   10/20/19 0600  65 16 111/70 97 %   10/20/19 0500  71 12 116/74 98 %          Past Medical History:   Diagnosis Date    Essential hypertension 10/18/2019    Hypercholesterolemia     Ill-defined condition     sacrcoidosis    Neurological disorder     herniated disc    Other hyperlipidemia 10/18/2019    Sarcoidosis       ROS-pertinents  negative except as above  The pertinent portions of the medical history,physician and nursing notes, meds,vitals , labs and Ins/Outs,are reviewed in the electronic record.     Results for orders placed or performed during the hospital encounter of 10/18/19   EKG, 12 LEAD, INITIAL   Result Value Ref Range    Ventricular Rate 79 BPM    Atrial Rate 79 BPM    P-R Interval 134 ms    QRS Duration 92 ms    Q-T Interval 378 ms    QTC Calculation (Bezet) 433 ms    Calculated P Axis 68 degrees    Calculated R Axis 20 degrees    Calculated T Axis -9 degrees    Diagnosis       Normal sinus rhythm  Non-specific ST & T wave changes  When compared with ECG of 03-NOV-2015 12:39,  No significant change was found  Confirmed by Bjorn Velasco Juanjose DUMONT ([id number]) on 10/18/2019 9:34:54 AM     Results for orders placed or performed in visit on 08/07/19   AMB POC EKG ROUTINE W/ 12 LEADS, INTER & REP    Impression    NSR. WNL. No change compared to old EKG.       Vitals:    10/20/19 0900 10/20/19 1000 10/20/19 1243 10/20/19 1355   BP: (!) 139/91 129/70 135/86 (!) 130/97   BP 1 Location:   Right arm Right arm   BP Patient Position:   At rest At rest   Pulse: (!) 104 (!) 101 67 67   Resp: 25 21 17 16   Temp:   98.5 °F (36.9 °C) 97.6 °F (36.4 °C)   SpO2: 99% 97% 100% 99%   Weight:           Objective:    Physical Exam:   Patient Vitals for the past 12 hrs:   Temp Pulse Resp BP SpO2   10/20/19 1355 97.6 °F (36.4 °C) 67 16 (!) 130/97 99 %   10/20/19 1243 98.5 °F (36.9 °C) 67 17 135/86 100 %   10/20/19 1000  (!) 101 21 129/70 97 %   10/20/19 0900  (!) 104 25 (!) 139/91 99 %   10/20/19 0800 98.1 °F (36.7 °C) 70 15 98/66 95 %   10/20/19 0600  65 16 111/70 97 %   10/20/19 0500  71 12 116/74 98 %      General:  alert, cooperative, no distress, appears stated age   ENT, Neck:  no jvd   Chest Wall: inspection normal - no chest wall deformities or tenderness, respiratory effort normal   Lung: clear to auscultation bilaterally   Heart:  normal rate, regular rhythm, normal S1, S2, no murmurs, rubs, clicks or gallops   Abdomen: nondistended   Extremities: extremities normal, atraumatic, no cyanosis or edema     Last 24hr Input/Output:    Intake/Output Summary (Last 24 hours) at 10/20/2019 1650  Last data filed at 10/20/2019 0900  Gross per 24 hour   Intake 840 ml   Output 875 ml   Net -35 ml        Data Review:   Recent Results (from the past 24 hour(s))   FACTOR VIII    Collection Time: 10/19/19  8:44 PM   Result Value Ref Range    Factor VIII 160 80 - 200 %      Dar Velasquez MD 10/20/2019

## 2019-10-20 NOTE — PROGRESS NOTES
1930  Received updated report from [facility] and [facility].    [address]  Wife and daughter at bedside. Shift summary  Pt had an uneventful night. Pt had a few dizzy spell while in the bed and did receive one dose of meclizine for it. Pt remains on room air. Pt 2 peripheral IV's are saline locked. Pt was able to get up and move around on his own throughout the shift. Pt is currently resting with bed in the lowest position and personal items and call bell with in reach. Pt has no complaints at this time.

## 2019-10-20 NOTE — PROGRESS NOTES
0730-Bedside and Verbal shift change report given to [address] (oncoming nurse) by Hakeem Davidson RN (offgoing nurse).  Report included the following information SBAR, Kardex, ED Summary, Intake/Output, MAR, Recent Results and Cardiac Rhythm SR.

## 2019-10-20 NOTE — PROGRESS NOTES
TRANSFER - OUT REPORT:    Verbal report given to Jose C RN(name) on Eirk Farley  being transferred to [facility](unit) for routine progression of care       Report consisted of patients Situation, Background, Assessment and   Recommendations(SBAR). Information from the following report(s) SBAR, Kardex, ED Summary, Intake/Output, MAR, Recent Results and Cardiac Rhythm Sr/ST was reviewed with the receiving nurse. Lines:   Peripheral IV 10/18/19 Left Antecubital (Active)   Site Assessment Clean, dry, & intact 10/20/2019  8:00 AM   Phlebitis Assessment 0 10/20/2019  8:00 AM   Infiltration Assessment 0 10/20/2019  8:00 AM   Dressing Status Clean, dry, & intact 10/20/2019  8:00 AM   Dressing Type Tape;Transparent 10/20/2019  8:00 AM   Hub Color/Line Status Pink;Capped 10/20/2019  8:00 AM   Action Taken Open ports on tubing capped 10/20/2019  4:00 AM   Alcohol Cap Used Yes 10/20/2019  8:00 AM       Peripheral IV 10/18/19 Left; Inner Antecubital (Active)   Site Assessment Clean, dry, & intact 10/20/2019  8:00 AM   Phlebitis Assessment 0 10/20/2019  8:00 AM   Infiltration Assessment 0 10/20/2019  8:00 AM   Dressing Status Clean, dry, & intact 10/20/2019  8:00 AM   Dressing Type Tape;Transparent 10/20/2019  8:00 AM   Hub Color/Line Status Pink;Capped 10/20/2019  8:00 AM   Action Taken Open ports on tubing capped 10/20/2019  4:00 AM   Alcohol Cap Used Yes 10/20/2019  8:00 AM        Opportunity for questions and clarification was provided.       Patient transported with:   Registered Nurse

## 2019-10-21 ENCOUNTER — APPOINTMENT (OUTPATIENT)
Dept: CARDIAC CATH/INVASIVE PROCEDURES | Age: 49
End: 2019-10-21
Attending: SPECIALIST
Payer: COMMERCIAL

## 2019-10-21 ENCOUNTER — ANESTHESIA (OUTPATIENT)
Dept: CARDIAC CATH/INVASIVE PROCEDURES | Age: 49
End: 2019-10-21
Payer: COMMERCIAL

## 2019-10-21 ENCOUNTER — ANESTHESIA EVENT (OUTPATIENT)
Dept: CARDIAC CATH/INVASIVE PROCEDURES | Age: 49
End: 2019-10-21
Payer: COMMERCIAL

## 2019-10-21 VITALS
TEMPERATURE: 98.2 F | DIASTOLIC BLOOD PRESSURE: 96 MMHG | SYSTOLIC BLOOD PRESSURE: 136 MMHG | HEART RATE: 68 BPM | RESPIRATION RATE: 18 BRPM | OXYGEN SATURATION: 98 % | WEIGHT: 189.5 LBS | BODY MASS INDEX: 25.7 KG/M2

## 2019-10-21 PROBLEM — Z95.818 STATUS POST PLACEMENT OF IMPLANTABLE LOOP RECORDER: Status: ACTIVE | Noted: 2019-10-21

## 2019-10-21 LAB
APCR PPP: 2.9 RATIO (ref 2.2–3.5)
AT III AG PPP IA-ACNC: 88 % (ref 72–124)
AT III PPP CHRO-ACNC: 101 % (ref 75–135)
PROTHROM ACT/NOR PPP: 125 % (ref 50–154)

## 2019-10-21 PROCEDURE — 74011250637 HC RX REV CODE- 250/637: Performed by: NURSE PRACTITIONER

## 2019-10-21 PROCEDURE — 74011250637 HC RX REV CODE- 250/637: Performed by: PSYCHIATRY & NEUROLOGY

## 2019-10-21 PROCEDURE — 74011000250 HC RX REV CODE- 250: Performed by: INTERNAL MEDICINE

## 2019-10-21 PROCEDURE — 74011250637 HC RX REV CODE- 250/637: Performed by: INTERNAL MEDICINE

## 2019-10-21 PROCEDURE — 99218 HC RM OBSERVATION: CPT

## 2019-10-21 PROCEDURE — 76060000031 HC ANESTHESIA FIRST 0.5 HR

## 2019-10-21 PROCEDURE — 33285 INSJ SUBQ CAR RHYTHM MNTR: CPT | Performed by: INTERNAL MEDICINE

## 2019-10-21 PROCEDURE — 74011250636 HC RX REV CODE- 250/636: Performed by: NURSE ANESTHETIST, CERTIFIED REGISTERED

## 2019-10-21 PROCEDURE — C1764 EVENT RECORDER, CARDIAC: HCPCS | Performed by: INTERNAL MEDICINE

## 2019-10-21 PROCEDURE — C8925 2D TEE W OR W/O FOL W/CON,IN: HCPCS

## 2019-10-21 DEVICE — MON LNQ11 REVEAL LINQ USA FW2.0
Type: IMPLANTABLE DEVICE | Status: FUNCTIONAL
Brand: REVEAL LINQ™

## 2019-10-21 RX ORDER — LIDOCAINE HYDROCHLORIDE 10 MG/ML
INJECTION INFILTRATION; PERINEURAL AS NEEDED
Status: DISCONTINUED | OUTPATIENT
Start: 2019-10-21 | End: 2019-10-21 | Stop reason: HOSPADM

## 2019-10-21 RX ORDER — ASPIRIN 81 MG/1
81 TABLET ORAL DAILY
Qty: 90 TAB | Refills: 3 | Status: SHIPPED | COMMUNITY
Start: 2019-10-21 | End: 2021-08-23

## 2019-10-21 RX ORDER — CEPHALEXIN 250 MG/1
250 CAPSULE ORAL 3 TIMES DAILY
Qty: 15 CAP | Refills: 0 | Status: SHIPPED | OUTPATIENT
Start: 2019-10-21 | End: 2019-10-30

## 2019-10-21 RX ORDER — MECLIZINE HYDROCHLORIDE 25 MG/1
25 TABLET ORAL
Qty: 30 TAB | Refills: 3 | Status: SHIPPED | OUTPATIENT
Start: 2019-10-21 | End: 2019-10-31

## 2019-10-21 RX ORDER — SODIUM CHLORIDE 9 MG/ML
INJECTION, SOLUTION INTRAVENOUS
Status: DISCONTINUED | OUTPATIENT
Start: 2019-10-21 | End: 2019-10-21 | Stop reason: HOSPADM

## 2019-10-21 RX ORDER — PROPOFOL 10 MG/ML
INJECTION, EMULSION INTRAVENOUS AS NEEDED
Status: DISCONTINUED | OUTPATIENT
Start: 2019-10-21 | End: 2019-10-21 | Stop reason: HOSPADM

## 2019-10-21 RX ORDER — LISINOPRIL 10 MG/1
10 TABLET ORAL DAILY
Qty: 90 TAB | Refills: 3 | Status: SHIPPED | OUTPATIENT
Start: 2019-10-21 | End: 2020-10-10

## 2019-10-21 RX ORDER — LISINOPRIL 10 MG/1
10 TABLET ORAL DAILY
Status: DISCONTINUED | OUTPATIENT
Start: 2019-10-21 | End: 2019-10-21 | Stop reason: HOSPADM

## 2019-10-21 RX ORDER — CEPHALEXIN 250 MG/1
250 CAPSULE ORAL 3 TIMES DAILY
Status: DISCONTINUED | OUTPATIENT
Start: 2019-10-21 | End: 2019-10-21 | Stop reason: HOSPADM

## 2019-10-21 RX ORDER — ROSUVASTATIN CALCIUM 40 MG/1
40 TABLET, COATED ORAL
Qty: 90 TAB | Refills: 3 | Status: SHIPPED | OUTPATIENT
Start: 2019-10-21 | End: 2020-10-10

## 2019-10-21 RX ADMIN — PROPOFOL 50 MG: 10 INJECTION, EMULSION INTRAVENOUS at 13:30

## 2019-10-21 RX ADMIN — PROPOFOL 30 MG: 10 INJECTION, EMULSION INTRAVENOUS at 13:33

## 2019-10-21 RX ADMIN — ASPIRIN 81 MG: 81 TABLET, COATED ORAL at 08:50

## 2019-10-21 RX ADMIN — PROPOFOL 20 MG: 10 INJECTION, EMULSION INTRAVENOUS at 13:36

## 2019-10-21 RX ADMIN — PROPOFOL 30 MG: 10 INJECTION, EMULSION INTRAVENOUS at 13:29

## 2019-10-21 RX ADMIN — PROPOFOL 50 MG: 10 INJECTION, EMULSION INTRAVENOUS at 13:26

## 2019-10-21 RX ADMIN — PROPOFOL 30 MG: 10 INJECTION, EMULSION INTRAVENOUS at 13:27

## 2019-10-21 RX ADMIN — LISINOPRIL 10 MG: 10 TABLET ORAL at 08:50

## 2019-10-21 RX ADMIN — PROPOFOL 30 MG: 10 INJECTION, EMULSION INTRAVENOUS at 13:28

## 2019-10-21 RX ADMIN — SODIUM CHLORIDE: 900 INJECTION, SOLUTION INTRAVENOUS at 13:24

## 2019-10-21 RX ADMIN — PROPOFOL 50 MG: 10 INJECTION, EMULSION INTRAVENOUS at 13:31

## 2019-10-21 RX ADMIN — PROPOFOL 20 MG: 10 INJECTION, EMULSION INTRAVENOUS at 13:37

## 2019-10-21 RX ADMIN — CEPHALEXIN 250 MG: 250 CAPSULE ORAL at 17:23

## 2019-10-21 RX ADMIN — PROPOFOL 30 MG: 10 INJECTION, EMULSION INTRAVENOUS at 13:32

## 2019-10-21 RX ADMIN — PROPOFOL 30 MG: 10 INJECTION, EMULSION INTRAVENOUS at 13:35

## 2019-10-21 RX ADMIN — BENZOCAINE, BUTAMBEN, AND TETRACAINE HYDROCHLORIDE 3 SPRAY: .028; .004; .004 AEROSOL, SPRAY TOPICAL at 13:26

## 2019-10-21 NOTE — DISCHARGE INSTRUCTIONS
Patient Discharge Instructions    Cierra Gaitan / [id number] : 1970    Admitted 10/18/2019 Discharged: 10/21/2019     Take Home Medications     Keflex three times a day 15 days      · It is important that you take the medication exactly as they are prescribed. · Keep your medication in the bottles provided by the pharmacist and keep a list of the medication names, dosages, and times to be taken in your wallet. · Do not take other medications without consulting your doctor. What to do at Home    Recommended diet: Cardiac Diet. Recommended activity: Activity as tolerated. Follow-up with Dr. Zelalem Sanchez in 1 week. Follow-up with Dr. Oral Hastings (cardiologist) per his recommendations. Follow-up with [facility] Neurologist in 4 weeks. Future Appointments   Date Time Provider Morelia Kaur   2019  9:00 AM Loop recorder checkCass SCHED   1/15/2020  8:45 AM Binta Dennis MD [facility] ARIANNE SCHED                 Information obtained by :  I understand that if any problems occur once I am at home I am to contact my physician. I understand and acknowledge receipt of the instructions indicated above. Physician's or R.N.'s Signature                                                                  Date/Time                                                                                                                                              Patient or Representative Signature                                                          Date/Time      Discharge Instructions Post Implantable Loop Recorder    You may remove the dressing the day after your procedure. OK to shower the day following your procedure. Do not scrub or peel skin glue. It will degrade on its own over time.     Follow up in Dr. Destiney Morejon office as noted below.    Future Appointments   Date Time Provider Morelia Natasha   1/15/2020  8:45 AM MD Destinee Peña M.D.   Electrophysiology/Cardiology  [facility] [place]  [facility], [facility] [address] [place] [address]  [phone]

## 2019-10-21 NOTE — PROGRESS NOTES
TRANSFER - IN REPORT:    Verbal report received from CHANDRIKA Vega RN on Brad Stevens, Procedure: LUPE\Loop Recorder Implant , from the Cardiac Cath lab, for routine progression of care. Report consisted of patients Situation, Background, Assessment and Recommendations(SBAR). Information from the following report(s) Procedure Summary, MAR and Recent Results was reviewed with the receiving clinician. Opportunity for questions and clarification was provided. Assessment completed upon patients arrival to [address] [place] [address] and care assumed. Cardiac Cath Lab Recovery Arrival Note:     Brad Stevens arrived to [facility] recovery area. Patient procedure= LUPE\Loop Recorder Implant. Patient on cardiac monitor, non-invasive blood pressure, Patient status doing well without problems. Patient is A&Ox 4 . Patient reports no pain, no chest pain, no n/v. Procedure site without any bleeding and no hematoma.

## 2019-10-21 NOTE — PROGRESS NOTES
Interventional Cardiology Progress Note    Name: Tatiana Reyez  : 1970  Date: 10/21/2019      51 yo  male with HTN, Sarcoidosis, dyslipidemia admitted with stroke. Appears thromboembolic due to two territories affected. Informed consent obtained. Transesophageal echocardiogram performed without difficulty. Appreciate assistance of her anesthesiology colleagues. Please see full report under cardiology section of results. Left ventricle: Mild concentric hypertrophy. Normal systolic function with EF greater than 55%. Right ventricle: Normal size and function. Right atrium: Normal size. No evidence of thrombus. Left atrium: Normal size. No evidence of thrombus. Left atrial appendage: No thrombus. Normal velocities. Interatrial septum: Intact to Doppler and bubble study. No evidence of shunting. Aortic valve: Trileaflet. Structurally normal.  No evidence of stenosis or insufficiency. Mitral valve: Structurally normal.  No evidence of stenosis or insufficiency. Pulmonic valve: Structurally normal.  Trace insufficiency. Tricuspid valve: Structurally normal. Trace regurgitation. Aorta: Normal caliber. No plaque. Normal sinus rhythm. Assessment and plan:  Mild concentric left ventricular hypertrophy with preserved systolic function. No significant valvular heart disease. No evidence of intracardiac thrombus. No evidence of interatrial septal defect. Proceed with implantable loop recorder for cryptogenic stroke.       Conner Jaramillo MD  [phone]  10/21/19

## 2019-10-21 NOTE — ANESTHESIA POSTPROCEDURE EVALUATION
Post-Anesthesia Evaluation and Assessment    Patient: Miky Lockett MRN: [id number]  SSN: [id number]    YOB: 1970  Age: 52 y.o. Sex: male       Cardiovascular Function/Vital Signs  Visit Vitals  /64   Pulse 72   Temp 36.6 °C (97.9 °F)   Resp 9   Wt 86 kg (189 lb 8 oz)   SpO2 99%   BMI 25.70 kg/m²       Patient is status post * No anesthesia type entered * anesthesia for * No procedures listed *. Nausea/Vomiting: None    Postoperative hydration reviewed and adequate. Pain:  Pain Scale 1: Numeric (0 - 10) (10/21/19 1358)  Pain Intensity 1: 0 (10/21/19 1358)   Managed    Neurological Status:   Neuro  Neurologic State: Alert;Eyes open spontaneously (10/21/19 0800)  Orientation Level: Oriented X4 (10/21/19 0800)  Cognition: Follows commands; Appropriate for age attention/concentration; Appropriate safety awareness (10/21/19 0800)  Speech: Clear (10/21/19 0800)  Assessment L Pupil: Brisk;Round (10/21/19 0800)  Size L Pupil (mm): 3 (10/21/19 0800)  Assessment R Pupil: Brisk;Round (10/21/19 0800)  Size R Pupil (mm): 3 (10/21/19 0800)  LUE Motor Response: Purposeful (10/21/19 0800)  LLE Motor Response: Purposeful (10/21/19 0800)  RUE Motor Response: Purposeful (10/21/19 0800)  RLE Motor Response: Purposeful (10/21/19 0800)   At baseline    Mental Status and Level of Consciousness: Alert and oriented to person, place, and time    Pulmonary Status:   O2 Device: CO2 nasal cannula (10/21/19 1349)   Adequate oxygenation and airway patent    Complications related to anesthesia: None    Post-anesthesia assessment completed. No concerns    Signed By: Radha Vargas MD     October 21, 2019              * No procedures listed *.     MAC    <BSHSIANPOST>    Vitals Value Taken Time   /64 10/21/2019  1:58 PM   Temp     Pulse 72 10/21/2019  1:58 PM   Resp 9 10/21/2019  1:58 PM   SpO2 99 % 10/21/2019  1:58 PM

## 2019-10-21 NOTE — PROGRESS NOTES
TRANSFER - OUT REPORT:    Verbal report given to Yaa Delarosa RN (name) on Brad Stevens  being transferred to cath lab recovery room (unit) for routine progression of care       Report consisted of patients Situation, Background, Assessment and   Recommendations(SBAR). Information from the following report(s) SBAR, Procedure Summary and MAR was reviewed with the receiving nurse. Lines:   Peripheral IV 10/18/19 Left Antecubital (Active)   Site Assessment Clean, dry, & intact 10/21/2019 12:00 PM   Phlebitis Assessment 0 10/21/2019 12:00 PM   Infiltration Assessment 0 10/21/2019 12:00 PM   Dressing Status Clean, dry, & intact 10/21/2019 12:00 PM   Dressing Type Transparent;Tape 10/21/2019 12:00 PM   Hub Color/Line Status Pink;Capped;Flushed 10/21/2019 12:00 PM   Action Taken Open ports on tubing capped 10/21/2019 12:00 PM   Alcohol Cap Used Yes 10/21/2019 12:00 PM       Peripheral IV 10/18/19 Left; Inner Antecubital (Active)   Site Assessment Clean, dry, & intact 10/21/2019 12:00 PM   Phlebitis Assessment 0 10/21/2019 12:00 PM   Infiltration Assessment 0 10/21/2019 12:00 PM   Dressing Status Clean, dry, & intact 10/21/2019 12:00 PM   Dressing Type Transparent;Tape 10/21/2019 12:00 PM   Hub Color/Line Status Pink;Capped;Flushed 10/21/2019 12:00 PM   Action Taken Open ports on tubing capped 10/21/2019 12:00 PM   Alcohol Cap Used Yes 10/21/2019 12:00 PM        Opportunity for questions and clarification was provided.       Patient transported with:   Registered Nurse

## 2019-10-21 NOTE — PROGRESS NOTES
TRANSFER - OUT REPORT:    Verbal report given to RN(name) on Adwoa Littlejohn  being transferred to endoscopy(unit) for ordered procedure       Report consisted of patients Situation, Background, Assessment and   Recommendations(SBAR). Information from the following report(s) SBAR, Kardex, Intake/Output, MAR, Recent Results and Cardiac Rhythm NSR was reviewed with the receiving nurse. Lines:   Peripheral IV 10/18/19 Left Antecubital (Active)   Site Assessment Clean, dry, & intact 10/21/2019 12:00 PM   Phlebitis Assessment 0 10/21/2019 12:00 PM   Infiltration Assessment 0 10/21/2019 12:00 PM   Dressing Status Clean, dry, & intact 10/21/2019 12:00 PM   Dressing Type Transparent;Tape 10/21/2019 12:00 PM   Hub Color/Line Status Pink;Capped;Flushed 10/21/2019 12:00 PM   Action Taken Open ports on tubing capped 10/21/2019 12:00 PM   Alcohol Cap Used Yes 10/21/2019 12:00 PM       Peripheral IV 10/18/19 Left; Inner Antecubital (Active)   Site Assessment Clean, dry, & intact 10/21/2019 12:00 PM   Phlebitis Assessment 0 10/21/2019 12:00 PM   Infiltration Assessment 0 10/21/2019 12:00 PM   Dressing Status Clean, dry, & intact 10/21/2019 12:00 PM   Dressing Type Transparent;Tape 10/21/2019 12:00 PM   Hub Color/Line Status Pink;Capped;Flushed 10/21/2019 12:00 PM   Action Taken Open ports on tubing capped 10/21/2019 12:00 PM   Alcohol Cap Used Yes 10/21/2019 12:00 PM        Opportunity for questions and clarification was provided.       Patient transported with:   Registered Nurse

## 2019-10-21 NOTE — PROGRESS NOTES
Cardiac Cath Lab Procedure Area Arrival Note:    Murray Covarrubias arrived to Cardiac Cath Lab, Procedure Area. Patient identifiers verified with NAME and DATE OF BIRTH. Procedure verified with patient. Consent forms verified. Allergies verified. Patient informed of procedure and plan of care. Questions answered with review. Patient voiced understanding of procedure and plan of care. Patient on cardiac monitor, non-invasive blood pressure, SPO2 monitor. On room air then placed on O2 @ 2 lpm via NC.  IV of normal saline on pump at 25 ml/hr. Patient status doing well without problems. Patient is A&Ox 4. Patient reports no pain. Patient medicated during procedure with orders obtained and verified by Dr. Cranston Najjar. Refer to patients Cardiac Cath Lab PROCEDURE REPORT for vital signs, assessment, status, and response during procedure, printed at end of case. Printed report on chart or scanned into chart.

## 2019-10-21 NOTE — PROCEDURES
Cardiac Procedure Note   Patient: Estiven Farris  MRN: [id number]  SSN: [id number]   YOB: 1970 Age: 52 y.o.   Sex: male    Date of Procedure: 10/21/2019   Pre-procedure Diagnosis: unexplained or cryptogenic stroke  Post-procedure Diagnosis: same  Procedure: implanted loop recorder  :  Dr. Kathy Ibarra MD    Assistant(s):  None  Anesthesia: Moderate Sedation after LUPE with Dr Tona Timmons but lidocaine was given by me  Estimated Blood Loss: Less than 10 mL   Specimens Removed: None  Findings: loop recorder under left chest wall toward apex  Complications: None   Implants: [employer] loop recorder implant  Signed by:  Kathy Ibarra MD  10/21/2019  3:40 PM

## 2019-10-21 NOTE — PROGRESS NOTES
Pharmacist Discharge Medication Reconciliation    Discharging Provider: Dr Maurice Aranda PMH:   Past Medical History:   Diagnosis Date    Essential hypertension 10/18/2019    Hypercholesterolemia     Ill-defined condition     sacrcoidosis    Neurological disorder     herniated disc    Other hyperlipidemia 10/18/2019    Sarcoidosis      Chief Complaint for this Admission:   Chief Complaint   Patient presents with    Dizziness     Allergies: Patient has no known allergies. Discharge Medications:   Current Discharge Medication List        START taking these medications    Details   aspirin delayed-release 81 mg tablet Take 1 Tab by mouth daily. Qty: 90 Tab, Refills: 3      lisinopril (PRINIVIL, ZESTRIL) 10 mg tablet Take 1 Tab by mouth daily. Qty: 90 Tab, Refills: 3      meclizine (ANTIVERT) 25 mg tablet Take 1 Tab by mouth every four (4) hours as needed for Dizziness for up to 10 days. Qty: 30 Tab, Refills: 3      rosuvastatin (CRESTOR) 40 mg tablet Take 1 Tab by mouth nightly. Qty: 90 Tab, Refills: 3           CONTINUE these medications which have NOT CHANGED    Details   zolpidem (AMBIEN) 5 mg tablet Take 1 Tab by mouth nightly as needed for Sleep. Max Daily Amount: 5 mg. Qty: 30 Tab, Refills: 2    Associated Diagnoses: Insomnia, unspecified type           STOP taking these medications       trandolapril (MAVIK) 4 mg tablet Comments:   Reason for Stopping:               The patient's chart, MAR and AVS were reviewed by Amie Celis.

## 2019-10-21 NOTE — PROGRESS NOTES
Anesthesia name:  Milton Alexander    Anesthesia is present for case. Refer to anesthesia log for vitals.

## 2019-10-21 NOTE — PROGRESS NOTES
Rhett Alexander, [facility]    Admit Date: 10/18/2019    Subjective:     Events noted. MRI shows (to my surprise), acute CVA. Vertigo improving. Current Facility-Administered Medications   Medication Dose Route Frequency    lisinopril (PRINIVIL, ZESTRIL) tablet 10 mg  10 mg Oral DAILY    rosuvastatin (CRESTOR) tablet 40 mg  40 mg Oral QHS    aspirin delayed-release tablet 81 mg  81 mg Oral DAILY    ondansetron (ZOFRAN) injection 4 mg  4 mg IntraVENous Q6H PRN    acetaminophen (TYLENOL) tablet 650 mg  650 mg Oral Q4H PRN    cloNIDine HCl (CATAPRES) tablet 0.1 mg  0.1 mg Oral Q4H PRN    meclizine (ANTIVERT) tablet 25 mg  25 mg Oral Q4H PRN          Objective:     Patient Vitals for the past 8 hrs:   BP Temp Pulse Resp Weight   10/21/19 0615 123/82 98.2 °F (36.8 °C) 75 14    10/21/19 0215 112/85 98.5 °F (36.9 °C) 73 13 189 lb 8 oz (86 kg)     No intake/output data recorded. 10/19 1901 - 10/21 0700  In: 840 [P.O.:840]  Out: 875 [Urine:875]    Physical Exam: NAD. A&O. Neck -- Supple. No JVD. Heart -- RRR. Lungs -- CTA. Abd -- Benign. Ext -- No LE edema, b/l. Data Review No results found for this or any previous visit (from the past 24 hour(s)). Assessment:     Principal Problem:    CVA (cerebral vascular accident) -- ?etiol. ?embolic. Active Problems:    Essential hypertension (10/18/2019)      Other hyperlipidemia (10/18/2019)        Plan:     1. For LUPE then implantable loop monitor today. 2. For now, continue ASA, Crestor. 3. Lisinopril instead of trandolapril for BP control. 4. Discharge home later today after above. D/w Dr. Katya Francisco.           Jeanine Yoo MD

## 2019-10-21 NOTE — PROGRESS NOTES
Problem: Falls - Risk of  Goal: *Absence of Falls  Description  Document Cristiano Reyes Fall Risk and appropriate interventions in the flowsheet. Outcome: Progressing Towards Goal  Note:   Fall Risk Interventions:            Medication Interventions: Teach patient to arise slowly         History of Falls Interventions: Door open when patient unattended         Problem: TIA/CVA Stroke: 0-24 hours  Goal: Activity/Safety  Outcome: Progressing Towards Goal  Goal: Consults, if ordered  Outcome: Progressing Towards Goal  Goal: Diagnostic Test/Procedures  Outcome: Progressing Towards Goal  Goal: Nutrition/Diet  Outcome: Progressing Towards Goal  Goal: Discharge Planning  Outcome: Progressing Towards Goal  Goal: Medications  Outcome: Progressing Towards Goal  Goal: Respiratory  Outcome: Progressing Towards Goal  Goal: Treatments/Interventions/Procedures  Outcome: Progressing Towards Goal  Goal: Monitor for complications post-thrombolytic infusion  Outcome: Progressing Towards Goal  Goal: *Neurologically stable  Description  Absence of additional neurological deficits    Outcome: Progressing Towards Goal  Goal: *Absence of Signs of Aspiration on Current Diet  Outcome: Progressing Towards Goal     Problem: TIA/CVA Stroke: Day 2 Until Discharge  Goal: Nutrition/Diet  Outcome: Progressing Towards Goal  Goal: Discharge Planning  Outcome: Progressing Towards Goal  Goal: Medications  Outcome: Progressing Towards Goal  Goal: Treatments/Interventions/Procedures  Outcome: Progressing Towards Goal  Goal: *Tolerating diet  Outcome: Progressing Towards Goal     Problem: Pain  Goal: *Control of Pain  Outcome: Progressing Towards Goal     Problem: Pressure Injury - Risk of  Goal: *Prevention of pressure injury  Description  Document Jacob Scale and appropriate interventions in the flowsheet.   Outcome: Progressing Towards Goal  Note:   Pressure Injury Interventions:

## 2019-10-21 NOTE — CONSULTS
Cardiac Electrophysiology Hospital Consultation Note     Subjective:      Cheryle Noble is a 52 y.o. patient who is seen for evaluation of cryptogenic stroke. He presented to the ER on 10/18/2019 with complaint of severe acute vertigo since awakening that morning, began suddenly with nausea & ataxia. Head CT showed no acute intracranial process. He received tPA. MRI brain showed 2 small foci of acute ischemia in right parieto-occipital lobe with possible petechial hemorrhage. He continues with vertigo symptoms when he sits up or turns his head to the left. He denies history of atrial fibrillation. NSR on monitor, rates 70s. BP stable, last 122/87. Troponin negative. Recent cholesterol panel showed total cholesterol 254, , triglycerides 84, HDL 55. LFTs have been unremarkable. MRI brain (10/19/2019): 2 small foci of acute ischemia in the right parieto-occipital lobe with corresponding rim of subtle susceptibility artifact potentially representing petechial hemorrhage. CT & CTA head/neck (10/18/2019): No flow limiting stenosis or intracranial aneurysm. No acute intracranial process. Echo (10/18/2019): LVEF 56-60%. Bubble study with no evidence of right to left shunt. Previous:  Hyperlipidemia noted by Dr. Anil Gonzáles. Statin recommended, but refused. Exercise stress echo 2015. Results currently unavailable via [facility]. Works as a . Father had CVA.       Problem List  Date Reviewed: 8/7/2019          Codes Class Noted    * (Principal) CVA (cerebral vascular accident) [facility]) ICD-10-CM: I63.9  ICD-9-CM: 434.91  10/18/2019        Essential hypertension ICD-10-CM: I10  ICD-9-CM: 401.9  10/18/2019        Other hyperlipidemia ICD-10-CM: E78.49  ICD-9-CM: 272.4  10/18/2019        Sarcoidosis ICD-10-CM: D86.9  ICD-9-CM: 135  Unknown    Overview Signed 7/5/2017  9:57 AM by Kurt Martinez MD     Hilar and mediastinal adenopathy 2011 Current Facility-Administered Medications   Medication Dose Route Frequency Provider Last Rate Last Dose    lisinopril (PRINIVIL, ZESTRIL) tablet 10 mg  10 mg Oral DAILY Miko Gillette MD   10 mg at 10/21/19 0850    rosuvastatin (CRESTOR) tablet 40 mg  40 mg Oral QHS Carlos Vergara MD   40 mg at 10/20/19 2146    aspirin delayed-release tablet 81 mg  81 mg Oral DAILY Magdalene Wayne, DO   81 mg at 10/21/19 0850    ondansetron (ZOFRAN) injection 4 mg  4 mg IntraVENous Q6H PRN Miko Gillette MD        acetaminophen (TYLENOL) tablet 650 mg  650 mg Oral Q4H PRN Miko Gillette MD        cloNIDine HCl (CATAPRES) tablet 0.1 mg  0.1 mg Oral Q4H PRN Miko Gillette MD        meclizine (ANTIVERT) tablet 25 mg  25 mg Oral Q4H PRN Miko Gillette MD   25 mg at 10/20/19 0435     No Known Allergies  Past Medical History:   Diagnosis Date    Essential hypertension 10/18/2019    Hypercholesterolemia     Ill-defined condition     sacrcoidosis    Neurological disorder     herniated disc    Other hyperlipidemia 10/18/2019    Sarcoidosis      Past Surgical History:   Procedure Laterality Date    HX ORTHOPAEDIC      thumb    HX OTHER SURGICAL      jose cyst     Family History   Problem Relation Age of Onset    Cancer Mother         Lung    Hypertension Mother     Stroke Father     Hypertension Father      Social History     Tobacco Use    Smoking status: Never Smoker    Smokeless tobacco: Never Used   Substance Use Topics    Alcohol use: Yes     Comment: seldom        Review of Systems:   Constitutional: Negative for fever, chills, weight loss, malaise/fatigue. HEENT: Negative for nosebleeds, vision changes. Respiratory: Negative for cough, hemoptysis  Cardiovascular: Negative for chest pain, palpitations, orthopnea, claudication, leg swelling, syncope, and PND. Gastrointestinal: Negative for nausea, vomiting, diarrhea, blood in stool and melena. Genitourinary: Negative for dysuria, and hematuria. Musculoskeletal: Negative for myalgias, arthralgia. Skin: Negative for rash. Heme: Does not bleed or bruise easily. Neurological: Negative for speech change and focal weakness. + dizziness     Objective:     Visit Vitals  /87   Pulse 78   Temp 98.2 °F (36.8 °C)   Resp 14   Wt 189 lb 8 oz (86 kg)   SpO2 99%   BMI 25.70 kg/m²      Physical Exam:   Constitutional: Well-developed and well-nourished. No respiratory distress. Head: Normocephalic and atraumatic. Eyes: Pupils are equal, round  ENT: Hearing normal  Neck: Supple. No JVD present. No carotid bruit. Cardiovascular: Normal rate, regular rhythm. Exam reveals no gallop and no friction rub. No murmur heard. Pulmonary/Chest: Effort normal and breath sounds normal. No wheezes. Abdominal: Soft, no tenderness. Musculoskeletal: No edema. Neurological: Alert,oriented. Skin: Skin is warm and dry. Psychiatric: Normal mood and affect. Behavior is normal. Judgment and thought content normal.        Assessment/Plan:     Mr. Derrick Erickson had cryptogenic CVA, has history of hypertension & hyperlipidemia (untreated). He is scheduled for LUPE today to evaluate for ASD/PFO or residual thrombus. Dr. Joycie Schlatter recommended outpatient nuclear stress test.    Agree that statin is necessary. No known history of arrhythmia, & nothing significant noted via telemetry during current admission. Risks/benefits of 30 day loop monitor vs implantable loop monitor reviewed. He would like to speak with his wife, will let us know if he'd like to proceed later today. If LUPE is positive, would not proceed with ILR.     ADITHYA Wahl  [facility] [place]  10/21/19     Addendum from EP attending:   I have seen, examined patient, and discussed with nurse practitioner, registered nurse, reviewed, updated note and agree with the assessment and plan    I have talked to him Dr Danyelle Headley and his wife this am.   Vital signs are stable  Exam shows regular rhythm   Lungs clear  No carotid bruits  Assessment and Plan:  I have explained to him the possible mechanism of his stroke  He agrees with LUPE with Dr Jessica Sanchez  If it was negative for cause of his stroke, he agrees with implantable loop recorder for detection of PAF  His BP medication and cholesterol medications are managed by Dr Kesha Purdy      Thank you for involving me in this patient's care and please call with further concerns or questions. Richie Escalona M.D.   Electrophysiology/Cardiology  [facility] [place]  [facility] [address], [address] [facility], [address]  [phone]

## 2019-10-21 NOTE — ANESTHESIA PREPROCEDURE EVALUATION
Relevant Problems   No relevant active problems       Anesthetic History   No history of anesthetic complications            Review of Systems / Medical History  Patient summary reviewed, nursing notes reviewed and pertinent labs reviewed    Pulmonary  Within defined limits                 Neuro/Psych       CVA       Cardiovascular    Hypertension              Exercise tolerance: >4 METS     GI/Hepatic/Renal  Within defined limits              Endo/Other  Within defined limits           Other Findings              Physical Exam    Airway  Mallampati: II  TM Distance: > 6 cm  Neck ROM: normal range of motion   Mouth opening: Normal     Cardiovascular  Regular rate and rhythm,  S1 and S2 normal,  no murmur, click, rub, or gallop             Dental    Dentition: Upper dentition intact and Lower dentition intact     Pulmonary  Breath sounds clear to auscultation               Abdominal  GI exam deferred       Other Findings            Anesthetic Plan    ASA: 3  Anesthesia type: MAC            Anesthetic plan and risks discussed with: Patient

## 2019-10-21 NOTE — PROGRESS NOTES
CARMINE:    -Patient to have LUPE then implantable loop monitoring afterwards.   -Discharging home later today. No anticipated needs at this time.   -Spouse at bedside to transport home. CM available in case needs arise.  WILLIAMS Melo,CRM

## 2019-10-21 NOTE — PROGRESS NOTES
Bedside and Verbal shift change report given to [address] (oncoming nurse) by Leah Marcus (offgoing nurse). Report included the following information SBAR, Kardex, Intake/Output, MAR, Recent Results and Cardiac Rhythm NSR.

## 2019-10-21 NOTE — PROGRESS NOTES
TRANSFER - OUT REPORT:    Verbal report given to [address] on Kayla Mason being transferred to [place] [address] for routine progression of care       Report consisted of patients Situation, Background, Assessment and   Recommendations(SBAR). Information from the following report(s) SBAR, MAR, Recent Results and Cardiac Rhythm NSR was reviewed with the receiving nurse. Opportunity for questions and clarification was provided.

## 2019-10-21 NOTE — PROGRESS NOTES
Problem: Falls - Risk of  Goal: *Absence of Falls  Description  Document Jeri Miguel Fall Risk and appropriate interventions in the flowsheet.   10/21/2019 0123 by Royal Vivek RN  Outcome: Progressing Towards Goal  Note:   Fall Risk Interventions:            Medication Interventions: Teach patient to arise slowly, Evaluate medications/consider consulting pharmacy         History of Falls Interventions: Door open when patient unattended      10/21/2019 0029 by Royal Vivek RN  Note:   Fall Risk Interventions:            Medication Interventions: Teach patient to arise slowly, Evaluate medications/consider consulting pharmacy         History of Falls Interventions: Door open when patient unattended         Problem: Patient Education: Go to Patient Education Activity  Goal: Patient/Family Education  Outcome: Progressing Towards Goal     Problem: Patient Education: Go to Patient Education Activity  Goal: Patient/Family Education  Outcome: Progressing Towards Goal     Problem: TIA/CVA Stroke: Day 2 Until Discharge  Goal: Activity/Safety  Outcome: Progressing Towards Goal  Goal: Diagnostic Test/Procedures  Outcome: Progressing Towards Goal  Goal: Nutrition/Diet  Outcome: Progressing Towards Goal  Goal: Discharge Planning  Outcome: Progressing Towards Goal  Goal: Medications  Outcome: Progressing Towards Goal  Goal: Respiratory  Outcome: Progressing Towards Goal  Goal: Treatments/Interventions/Procedures  Outcome: Progressing Towards Goal  Goal: Psychosocial  Outcome: Progressing Towards Goal  Goal: *Verbalizes anxiety and depression are reduced or absent  Outcome: Progressing Towards Goal  Goal: *Absence of aspiration  Outcome: Progressing Towards Goal  Goal: *Absence of deep venous thrombosis signs and symptoms(Stroke Metric)  Outcome: Progressing Towards Goal  Goal: *Optimal pain control at patient's stated goal  Outcome: Progressing Towards Goal  Goal: *Tolerating diet  Outcome: Progressing Towards Goal  Goal: *Ability to perform ADLs and demonstrates progressive mobility and function  Outcome: Progressing Towards Goal  Goal: *Stroke education continued(Stroke Metric)  Outcome: Progressing Towards Goal     Problem: Pain  Goal: *Control of Pain  Outcome: Progressing Towards Goal     Problem: Patient Education: Go to Patient Education Activity  Goal: Patient/Family Education  Outcome: Progressing Towards Goal     Problem: Pressure Injury - Risk of  Goal: *Prevention of pressure injury  Description  Document Jacob Scale and appropriate interventions in the flowsheet.   Outcome: Progressing Towards Goal  Note:   Pressure Injury Interventions:                                            Problem: Patient Education: Go to Patient Education Activity  Goal: Patient/Family Education  Outcome: Progressing Towards Goal

## 2019-10-22 LAB
B2 GLYCOPROT1 IGG SER-ACNC: <9 GPI IGG UNITS (ref 0–20)
B2 GLYCOPROT1 IGM SER-ACNC: <9 GPI IGM UNITS (ref 0–32)
CARDIOLIPIN IGA SER IA-ACNC: <9 APL U/ML (ref 0–11)
CARDIOLIPIN IGG SER IA-ACNC: <9 GPL U/ML (ref 0–14)
CARDIOLIPIN IGM SER IA-ACNC: <9 MPL U/ML (ref 0–12)

## 2019-10-22 NOTE — DISCHARGE SUMMARY
Physician Discharge Summary     Patient ID:  Shun Vergara  [id number]  52 y.o.  1970    Admit date: 10/18/2019    Discharge date and time: 10/22/2019    Briefly, pt was admitted with CVA (cerebral vascular accident) ([facility] [address] [I63.9]. For details of admission, see H&P. Hospital Course:  Pt presented to the ER with acute vertigo. Teleneurology in the ER felt sx's were due to a CVA, and TPA was given. Pt was monitored in the ICU. His vertigo seemed to improve some. An MRI showed 2 small foci of acute ischemia in the right parieto-occipital lobe with corresponding rim of subtle susceptibility artifact potentially representing petechial hemorrhage. ASA was continued. Cardiology saw pt, and a LUPE was done -- No thrombi, etc, seen. While there was no evidence of atrial fibrillation, the cause of the CVA is unclear. Therefore, Dr. Marisol Jones placed an implantable loop recorder. Pt was discharged home on ASA & Crestor. Discharge Dx: CVA    Condition at discharge: Improved. Disposition: home    Patient Instructions:   Cannot display discharge medications since this patient is not currently admitted. Follow-up with Dr. Danyelle Headley in 1 week. F/u with Neurology in a month. F/u with Dr. Marisol Jones per his recommendations.         Signed:  Vickie Wright MD  10/22/2019  5:11 PM

## 2019-10-23 ENCOUNTER — TELEPHONE (OUTPATIENT)
Dept: CARDIOLOGY CLINIC | Age: 49
End: 2019-10-23

## 2019-10-23 LAB
F5 GENE MUT ANL BLD/T: NORMAL
INTERPRETATION, 117893: NORMAL
PROT C PPP-ACNC: >199 % (ref 73–180)
PROT S ACT/NOR PPP: 115 % (ref 63–140)
SCREEN APTT: 33.9 SEC (ref 0–51.9)
SCREEN DRVVT: 38.1 SEC (ref 0–47)

## 2019-10-23 NOTE — TELEPHONE ENCOUNTER
Patient would like to reschedule his wound check on 11/1/19. Please advise.     Phone #: [phone]  Thanks

## 2019-10-23 NOTE — TELEPHONE ENCOUNTER
Verified patient with two types of identifiers. Rescheduled patient's wound check. Patient verbalized understanding and will call with any other questions.

## 2019-11-05 ENCOUNTER — OFFICE VISIT (OUTPATIENT)
Dept: CARDIOLOGY CLINIC | Age: 49
End: 2019-11-05

## 2019-11-05 DIAGNOSIS — Z95.818 STATUS POST PLACEMENT OF IMPLANTABLE LOOP RECORDER: Primary | ICD-10-CM

## 2019-11-08 ENCOUNTER — CLINICAL SUPPORT (OUTPATIENT)
Dept: CARDIOLOGY CLINIC | Age: 49
End: 2019-11-08

## 2019-11-08 DIAGNOSIS — Z95.818 STATUS POST PLACEMENT OF IMPLANTABLE LOOP RECORDER: Primary | ICD-10-CM

## 2019-11-08 NOTE — PROGRESS NOTES
Cardiac Electrophysiology Wound Check Note      Wound Check   Patient is here for wound check s/p ILR. No fever, drainage   Physical Exam   Constitutional: well-developed and well-nourished. Skin: Mid chest incision is healing without redness, drainage, hematoma. The wound is intact with skin glue. ASSESSMENT and PLAN   The incision is healing without redness, drainage, hematoma. Intact with skin glue.   The patient has finished their anti-biotic   current treatment plan is effective, no change in therapy   Device check shows proper lead and generator functions    Follow-up Disposition:  Return 3 months I will check via device clinic or remote monitoring in the future

## 2019-11-20 ENCOUNTER — OFFICE VISIT (OUTPATIENT)
Dept: CARDIOLOGY CLINIC | Age: 49
End: 2019-11-20

## 2019-11-20 VITALS
DIASTOLIC BLOOD PRESSURE: 90 MMHG | HEIGHT: 72 IN | BODY MASS INDEX: 26.36 KG/M2 | OXYGEN SATURATION: 100 % | WEIGHT: 194.6 LBS | SYSTOLIC BLOOD PRESSURE: 130 MMHG | HEART RATE: 78 BPM

## 2019-11-20 DIAGNOSIS — E78.49 OTHER HYPERLIPIDEMIA: ICD-10-CM

## 2019-11-20 DIAGNOSIS — I10 ESSENTIAL HYPERTENSION: ICD-10-CM

## 2019-11-20 DIAGNOSIS — I63.419 CEREBROVASCULAR ACCIDENT (CVA) DUE TO EMBOLISM OF MIDDLE CEREBRAL ARTERY, UNSPECIFIED BLOOD VESSEL LATERALITY (HCC): ICD-10-CM

## 2019-11-20 DIAGNOSIS — D86.9 SARCOIDOSIS: ICD-10-CM

## 2019-11-20 DIAGNOSIS — I63.419 CEREBROVASCULAR ACCIDENT (CVA) DUE TO EMBOLISM OF MIDDLE CEREBRAL ARTERY, UNSPECIFIED BLOOD VESSEL LATERALITY (HCC): Primary | ICD-10-CM

## 2019-11-20 NOTE — PATIENT INSTRUCTIONS
Continue  loop monitor HgbA1C 
TFT's 
Lipid panel scheduled for Dec 6 with Dr. Marbella Mendez Blood pressure controlled at home. Check BP/HR twice a day at home for 1 week Stress nuclear scan  
  
RTC 6 weeks to review BP and stress test.

## 2019-11-20 NOTE — PROGRESS NOTES
Progress Note    Patient: Denia Contreras MRN: [id number]  SSN: [id number]    YOB: 1970  Age: 52 y.o. Sex: male        Subjective:     Mr. Yolande Rodriguez is a 53 yo   Male recently admitted to [facility] with stroke of unclear etiology. The patient has a history of hypertension, dyslipidemia, and sarcoidosis. Echo and LUPE were unrevealing for cardiac defect. Implantable loop recorder was placed by Dr. Fanny Willams. To date, no dysrhythmias have been detected. Stroke is likely thromboembolic as two territories were involved. He has been doing well since discharge. No chest pain or shortness of breath. No edema. No syncope or pre-syncope. Objective:     Vitals:    11/20/19 0905   BP: 130/90   Pulse: 78   SpO2: 100%   Weight: 194 lb 9.6 oz (88.3 kg)   Height: 6' (1.829 m)        Physical Exam:   Head: Normocephalic, atraumatic. Eyes: Pupils equal, round, reactive to light and accomodation, Extra ocular muscles intact. Sclera anicteric. Ears: Grossly responsive to sound. Neck: No adenopathy. No bruits. Throat: No sores or erythema. Heart: Regular rate and rhythm. Normal S1 and S2. No murmurs, gallops, or rub. Lungs: Clear to auscultation bilaterally. No wheezing, rales, or rhonchi. Abdomen: Soft, non-tender. No guarding or rebound. No hepatosplenomegaly. Bowel sounds active. Ext: No edema. No ulceration. Skin: Normal coloration. Warm and dry. No rash. Neuro: Cranial nerves II through XII intact. Motor and sensory grossly intact. Affect: Appropriate. Alert and interactive. Current Outpatient Medications   Medication Sig Dispense    aspirin delayed-release 81 mg tablet Take 1 Tab by mouth daily. 90 Tab    lisinopril (PRINIVIL, ZESTRIL) 10 mg tablet Take 1 Tab by mouth daily. 90 Tab    rosuvastatin (CRESTOR) 40 mg tablet Take 1 Tab by mouth nightly. 90 Tab    zolpidem (AMBIEN) 5 mg tablet Take 1 Tab by mouth nightly as needed for Sleep.  Max Daily Amount: 5 mg. 30 Tab     No current facility-administered medications for this visit. Lab/Data Review:  Labs Latest Ref Rng & Units 10/19/2019 10/18/2019   WBC 4.1 - 11.1 K/uL 7.3 9.7   RBC 4.10 - 5.70 M/uL 4.57 4.85   Hemoglobin 12.1 - 17.0 g/dL 12.6 13.3   Hematocrit 36.6 - 50.3 % 39.6 42.4   MCV 80.0 - 99.0 FL 86.7 87.4   Platelets 532 - 013 K/uL 212 213   Lymphocytes 12 - 49 % 29 26   Monocytes 5 - 13 % 11 8   Eosinophils 0 - 7 % 1 1   Basophils 0 - 1 % 0 0   Albumin 3.5 - 5.0 g/dL 3.6 4.0   Calcium 8.5 - 10.1 MG/DL 8.7 9.2   SGOT 15 - 37 U/L 21 24   Glucose 65 - 100 mg/dL 113(H) 116(H)   BUN 6 - 20 MG/DL 19 22(H)   Creatinine 0.70 - 1.30 MG/DL 0.88 0.88   Sodium 136 - 145 mmol/L 138 139   Potassium 3.5 - 5.1 mmol/L 4.1 3.9   Some recent data might be hidden     10/18/19   ECHO LUPE W OR WO CONTRAST 10/21/2019 10/21/2019    Narrative · Left Ventricle: Normal cavity size, wall thickness, systolic function   (ejection fraction normal) and diastolic function. Estimated left   ventricular ejection fraction is 56 - 60%. No regional wall motion   abnormality noted. Normal left ventricular strain. · Interatrial Septum: Agitated saline contrast study was performed and   color flow Doppler was used. · Mitral Valve: Trace mitral valve regurgitation is present. Signed by: Barb Alicia MD     Lab Results   Component Value Date/Time    Cholesterol, total 254 (H) 08/07/2019 11:01 AM    HDL Cholesterol 55 08/07/2019 11:01 AM    LDL, calculated 182 (H) 08/07/2019 11:01 AM    VLDL, calculated 17 08/07/2019 11:01 AM    Triglyceride 84 08/07/2019 11:01 AM     No results found for: TSH, TSH2, TSH3, TSHP, TSHEXT, TSHEXT  No results found for: HBA1C, HGBE8, TMA7FCGF, KBN0MKKR, DWN8EPAP      Assessment/Plan:       ICD-10-CM ICD-9-CM    1. Cerebrovascular accident (CVA) due to embolism of middle cerebral artery, unspecified blood vessel laterality (HCC) I63.419 434.11    2. Essential hypertension I10 401.9    3. Sarcoidosis D86.9 135    4.  Other hyperlipidemia E78.49 272.4        Mr. Yara Nuñez is a 51 yo BM with thromboembolic stroke of unclear etiology. Evaluation for cardiac defect or dysrhythmia in progress. I had a long discussion with the patient and wife today regarding his hospitalization, evaluation to date, and further plans. We discussed the risks benefit ratio of anticoagulation at this point life versus later in its dependent upon a diagnosis. We also reviewed general cardiovascular risk factor reduction and the need for close attention to his diet, exercise, hypertension, and lipids. I have asked he check his blood pressure heart rate twice a day at home to assess for any trends. His target LDL should be less than 70. The patient has been diagnosed with sarcoidosis in the past however he is asymptomatic at this time. It is unclear if he truly has sarcoidosis. We discussed any limitations at this time, as the patient is anxious to return to regular exercise. I believe this will be okay. We discussed his frustration over lack of clear diagnosis and its implications on risk as well as treatment. I answered all their questions to the best of my ability. Continue interrogation of loop monitor  HgbA1C  TFT's  Lipid panel scheduled for Dec 6 with Dr. Gabriel Loya  Follow up blood pressure and titrate medications    Blood pressure controlled at home.  Check BP/HR BID at home for 1 weeks  Stress nuclear scan (per Dr. Elizabeth Mcclellan notes)    RTC 6 weeks to review BP and stress test.      Signed By: German Naqvi MD     November 20, 2019

## 2019-11-27 DIAGNOSIS — I10 ESSENTIAL HYPERTENSION: ICD-10-CM

## 2019-11-27 DIAGNOSIS — I63.419 CEREBROVASCULAR ACCIDENT (CVA) DUE TO EMBOLISM OF MIDDLE CEREBRAL ARTERY, UNSPECIFIED BLOOD VESSEL LATERALITY (HCC): ICD-10-CM

## 2019-11-27 DIAGNOSIS — D86.9 SARCOIDOSIS: ICD-10-CM

## 2019-11-27 DIAGNOSIS — E78.49 OTHER HYPERLIPIDEMIA: ICD-10-CM

## 2019-12-06 PROBLEM — Z79.899 ENCOUNTER FOR LONG-TERM (CURRENT) USE OF OTHER MEDICATIONS: Status: ACTIVE | Noted: 2019-12-06

## 2019-12-23 ENCOUNTER — OFFICE VISIT (OUTPATIENT)
Dept: CARDIOLOGY CLINIC | Age: 49
End: 2019-12-23

## 2019-12-23 DIAGNOSIS — Z95.818 STATUS POST PLACEMENT OF IMPLANTABLE LOOP RECORDER: Primary | ICD-10-CM

## 2020-01-10 PROBLEM — I63.9 CVA (CEREBRAL VASCULAR ACCIDENT) (HCC): Status: RESOLVED | Noted: 2019-10-18 | Resolved: 2020-01-10

## 2020-01-12 PROBLEM — H81.10 BPPV (BENIGN PAROXYSMAL POSITIONAL VERTIGO): Status: ACTIVE | Noted: 2020-01-12

## 2020-01-24 ENCOUNTER — OFFICE VISIT (OUTPATIENT)
Dept: CARDIOLOGY CLINIC | Age: 50
End: 2020-01-24

## 2020-01-24 DIAGNOSIS — Z95.818 STATUS POST PLACEMENT OF IMPLANTABLE LOOP RECORDER: Primary | ICD-10-CM

## 2020-02-25 ENCOUNTER — OFFICE VISIT (OUTPATIENT)
Dept: CARDIOLOGY CLINIC | Age: 50
End: 2020-02-25

## 2020-02-25 DIAGNOSIS — Z95.818 STATUS POST PLACEMENT OF IMPLANTABLE LOOP RECORDER: Primary | ICD-10-CM

## 2020-03-26 ENCOUNTER — OFFICE VISIT (OUTPATIENT)
Dept: CARDIOLOGY CLINIC | Age: 50
End: 2020-03-26

## 2020-03-26 DIAGNOSIS — Z95.818 STATUS POST PLACEMENT OF IMPLANTABLE LOOP RECORDER: Primary | ICD-10-CM

## 2020-04-16 ENCOUNTER — TELEPHONE (OUTPATIENT)
Dept: CARDIOLOGY CLINIC | Age: 50
End: 2020-04-16

## 2020-04-16 NOTE — TELEPHONE ENCOUNTER
Verified patient with two types of identifiers. Patient obtained second opinion regarding his stroke from Dr. Elizabeth Dillon in December 2019. Dr. Elizabeth Dillon office note in media section of patient's chart. Dr. Elizabeth Dillon does not believe patient had a stroke and just had \"benign vertigo. \" Based on this opinion, patient would like to stop using ILR. Notified patient I will let Dr. Emigdio Hicks; however, when patient's no longer want ILR MD normally allows patient to decide whether to remove device or leave in place.

## 2020-04-16 NOTE — TELEPHONE ENCOUNTER
Reviewed Dr. Anthony Pena' note, who feels that patient's symptoms of dizziness were due to vertigo as opposed to small areas of infarct noted via MRI; he feels infarcts are of undetermined age. Notes also said that ILR results would be helpful. It is up to him whether he'd like ILR explanted or simply stop monitoring. Thus far, no AF has been noted, but that's not to say it wouldn't be noted in the future.

## 2020-04-21 NOTE — TELEPHONE ENCOUNTER
Attempted to reach patient by telephone. A message was left for return call and also notify that I will send information in a [place] message.

## 2021-03-25 ENCOUNTER — IMMUNIZATION (OUTPATIENT)
Dept: INTERNAL MEDICINE CLINIC | Age: 51
End: 2021-03-25
Payer: COMMERCIAL

## 2021-03-25 DIAGNOSIS — Z23 ENCOUNTER FOR IMMUNIZATION: Primary | ICD-10-CM

## 2021-03-25 PROCEDURE — 0001A COVID-19, MRNA, LNP-S, PF, 30MCG/0.3ML DOSE(PFIZER): CPT | Performed by: FAMILY MEDICINE

## 2021-03-25 PROCEDURE — 91300 COVID-19, MRNA, LNP-S, PF, 30MCG/0.3ML DOSE(PFIZER): CPT | Performed by: FAMILY MEDICINE

## 2021-04-15 ENCOUNTER — IMMUNIZATION (OUTPATIENT)
Dept: INTERNAL MEDICINE CLINIC | Age: 51
End: 2021-04-15
Payer: COMMERCIAL

## 2021-04-15 DIAGNOSIS — Z23 ENCOUNTER FOR IMMUNIZATION: Primary | ICD-10-CM

## 2021-04-15 PROCEDURE — 0002A COVID-19, MRNA, LNP-S, PF, 30MCG/0.3ML DOSE(PFIZER): CPT | Performed by: FAMILY MEDICINE

## 2021-04-15 PROCEDURE — 91300 COVID-19, MRNA, LNP-S, PF, 30MCG/0.3ML DOSE(PFIZER): CPT | Performed by: FAMILY MEDICINE

## 2022-05-12 ENCOUNTER — TRANSCRIBE ORDER (OUTPATIENT)
Dept: SCHEDULING | Age: 52
End: 2022-05-12

## 2022-05-12 DIAGNOSIS — I63.39 CEREBRAL INFARCTION DUE TO THROMBOSIS OF OTHER CEREBRAL ARTERY (HCC): Primary | ICD-10-CM

## 2023-12-19 NOTE — PROGRESS NOTES
Spoke with Dr. Juan Luis Mcarthur and Dr. Tom Huber, both said patient may move to Neuro telemetry unit The patient/family was/were informed of limited nature of the exam. Representative images were printed to be scanned into the chart or directly uploaded into the medical record.

## (undated) DEVICE — 3M™ TEGADERM™ TRANSPARENT FILM DRESSING FRAME STYLE, 1626W, 4 IN X 4-3/4 IN (10 CM X 12 CM), 50/CT 4CT/CASE: Brand: 3M™ TEGADERM™

## (undated) DEVICE — GOWN,SIRUS,NONRNF,SETINSLV,2XL,18/CS: Brand: MEDLINE

## (undated) DEVICE — TELFA NON-ADHERENT PADS PREPAK: Brand: TELFA

## (undated) DEVICE — SPECIAL PROCEDURE DRAPE 32" X 34": Brand: SPECIAL PROCEDURE DRAPE

## (undated) DEVICE — SPONGE GZ W4XL4IN COT 12 PLY TYP VII WVN C FLD DSGN